# Patient Record
Sex: FEMALE | HISPANIC OR LATINO | Employment: UNEMPLOYED | URBAN - METROPOLITAN AREA
[De-identification: names, ages, dates, MRNs, and addresses within clinical notes are randomized per-mention and may not be internally consistent; named-entity substitution may affect disease eponyms.]

---

## 2023-11-27 ENCOUNTER — VIRTUAL VISIT (OUTPATIENT)
Dept: OBGYN | Facility: CLINIC | Age: 33
End: 2023-11-27
Payer: MEDICAID

## 2023-11-27 ENCOUNTER — TELEPHONE (OUTPATIENT)
Dept: MIDWIFE SERVICES | Facility: CLINIC | Age: 33
End: 2023-11-27

## 2023-11-27 VITALS — HEIGHT: 67 IN

## 2023-11-27 DIAGNOSIS — O21.9 NAUSEA/VOMITING IN PREGNANCY: Primary | ICD-10-CM

## 2023-11-27 DIAGNOSIS — Z34.82 ENCOUNTER FOR SUPERVISION OF OTHER NORMAL PREGNANCY IN SECOND TRIMESTER: Primary | ICD-10-CM

## 2023-11-27 PROCEDURE — 99207 PR NO CHARGE NURSE ONLY: CPT | Mod: 93

## 2023-11-27 RX ORDER — VITAMIN A ACETATE, .BETA.-CAROTENE, ASCORBIC ACID, CHOLECALCIFEROL, .ALPHA.-TOCOPHEROL ACETATE, DL-, THIAMINE MONONITRATE, RIBOFLAVIN, NIACINAMIDE, PYRIDOXINE HYDROCHLORIDE, FOLIC ACID, CYANOCOBALAMIN, CALCIUM CARBONATE, FERROUS FUMARATE, ZINC OXIDE, AND CUPRIC OXIDE 2000; 2000; 120; 400; 22; 1.84; 3; 20; 10; 1; 12; 200; 27; 25; 2 [IU]/1; [IU]/1; MG/1; [IU]/1; MG/1; MG/1; MG/1; MG/1; MG/1; MG/1; UG/1; MG/1; MG/1; MG/1; MG/1
1 TABLET ORAL DAILY
COMMUNITY

## 2023-11-27 RX ORDER — ONDANSETRON 4 MG/1
4 TABLET, FILM COATED ORAL EVERY 8 HOURS PRN
Qty: 30 TABLET | Refills: 1 | Status: SHIPPED | OUTPATIENT
Start: 2023-11-27 | End: 2024-02-28

## 2023-11-27 NOTE — PROGRESS NOTES
NPN nurse visit done over the phone. Pt will be given NPN folder and book at her upcoming appt.   Discussed optional screening available to assess chromosomal anomalies. Questions answered. Pt advised to call the clinic if she has any questions or concerns related to her pregnancy. Prenatal labs will be obtained at her upcoming appt. New prenatal visit scheduled on 23 with Natalee Adams CNM.     15w1d    Last pap: 16 (?)        Patient supplied answers from flow sheet for:  Prenatal OB Questionnaire.  Past Medical History  Have you ever recieved care for your mental health? : No  Have you ever been in a major accident or suffered serious trauma?: No  Within the last year, has anyone hit, slapped, kicked or otherwise hurt you?: No  In the last year, has anyone forced you to have sex when you didn't want to?: No    Past Medical History 2   Have you ever received a blood transfusion?: No  Would you accept a blood transfusion if was medically recommended?: Unknown  Does anyone in your home smoke?: No   Is your blood type Rh negative?: No  Have you ever ?: (!) Yes  Have you been hospitalized for a nonsurgical reason excluding normal delivery?: No  Have you ever had an abnormal pap smear?: No    Past Medical History (Continued)  Do you have a history of abnormalities of the uterus?: Unknown  Did your mother take MEKHI or any other hormones when she was pregnant with you?: No  Do you have any other problems we have not asked about which you feel may be important to this pregnancy?: No    ZAHEER Diamond

## 2023-11-27 NOTE — TELEPHONE ENCOUNTER
15w1d having a lot of N/V and requesting Zofran. Pharmacy marked. Will you prescribe please?    Initial OB: 23 ([facility])    ZAHEER Diamond

## 2023-12-03 LAB
ABO/RH(D): NORMAL
ANTIBODY SCREEN: NEGATIVE
SPECIMEN EXPIRATION DATE: NORMAL

## 2023-12-04 ENCOUNTER — ANCILLARY PROCEDURE (OUTPATIENT)
Dept: ULTRASOUND IMAGING | Facility: CLINIC | Age: 33
End: 2023-12-04
Payer: MEDICAID

## 2023-12-04 ENCOUNTER — LAB (OUTPATIENT)
Dept: LAB | Facility: CLINIC | Age: 33
End: 2023-12-04
Payer: MEDICAID

## 2023-12-04 DIAGNOSIS — Z34.82 ENCOUNTER FOR SUPERVISION OF OTHER NORMAL PREGNANCY IN SECOND TRIMESTER: ICD-10-CM

## 2023-12-04 LAB
ERYTHROCYTE [DISTWIDTH] IN BLOOD BY AUTOMATED COUNT: 21 % (ref 10–15)
HCT VFR BLD AUTO: 30.6 % (ref 35–47)
HGB BLD-MCNC: 9.6 G/DL (ref 11.7–15.7)
MCH RBC QN AUTO: 20.7 PG (ref 26.5–33)
MCHC RBC AUTO-ENTMCNC: 31.4 G/DL (ref 31.5–36.5)
MCV RBC AUTO: 66 FL (ref 78–100)
PLATELET # BLD AUTO: 315 10E3/UL (ref 150–450)
RBC # BLD AUTO: 4.63 10E6/UL (ref 3.8–5.2)
WBC # BLD AUTO: 6.7 10E3/UL (ref 4–11)

## 2023-12-04 PROCEDURE — 86900 BLOOD TYPING SEROLOGIC ABO: CPT

## 2023-12-04 PROCEDURE — 86762 RUBELLA ANTIBODY: CPT

## 2023-12-04 PROCEDURE — 36415 COLL VENOUS BLD VENIPUNCTURE: CPT

## 2023-12-04 PROCEDURE — 87389 HIV-1 AG W/HIV-1&-2 AB AG IA: CPT

## 2023-12-04 PROCEDURE — 87086 URINE CULTURE/COLONY COUNT: CPT

## 2023-12-04 PROCEDURE — 85027 COMPLETE CBC AUTOMATED: CPT

## 2023-12-04 PROCEDURE — 86780 TREPONEMA PALLIDUM: CPT

## 2023-12-04 PROCEDURE — 87340 HEPATITIS B SURFACE AG IA: CPT

## 2023-12-04 PROCEDURE — 86803 HEPATITIS C AB TEST: CPT

## 2023-12-04 PROCEDURE — 76805 OB US >/= 14 WKS SNGL FETUS: CPT | Performed by: FAMILY MEDICINE

## 2023-12-04 PROCEDURE — 86901 BLOOD TYPING SEROLOGIC RH(D): CPT

## 2023-12-04 PROCEDURE — 86850 RBC ANTIBODY SCREEN: CPT

## 2023-12-05 LAB
BACTERIA UR CULT: NORMAL
HBV SURFACE AG SERPL QL IA: NONREACTIVE
HCV AB SERPL QL IA: NONREACTIVE
HIV 1+2 AB+HIV1 P24 AG SERPL QL IA: NONREACTIVE
RUBV IGG SERPL QL IA: 2.23 INDEX
RUBV IGG SERPL QL IA: POSITIVE
T PALLIDUM AB SER QL: NONREACTIVE

## 2023-12-07 ENCOUNTER — PRENATAL OFFICE VISIT (OUTPATIENT)
Dept: MIDWIFE SERVICES | Facility: CLINIC | Age: 33
End: 2023-12-07
Payer: MEDICAID

## 2023-12-07 VITALS
WEIGHT: 210 LBS | BODY MASS INDEX: 32.96 KG/M2 | DIASTOLIC BLOOD PRESSURE: 82 MMHG | HEIGHT: 67 IN | SYSTOLIC BLOOD PRESSURE: 130 MMHG

## 2023-12-07 DIAGNOSIS — D64.9 ANEMIA, UNSPECIFIED TYPE: ICD-10-CM

## 2023-12-07 DIAGNOSIS — O21.9 NAUSEA AND VOMITING IN PREGNANCY PRIOR TO 22 WEEKS GESTATION: ICD-10-CM

## 2023-12-07 DIAGNOSIS — Z34.82 ENCOUNTER FOR SUPERVISION OF OTHER NORMAL PREGNANCY, SECOND TRIMESTER: Primary | ICD-10-CM

## 2023-12-07 LAB
HBA1C MFR BLD: 5.1 % (ref 0–5.6)
TSH SERPL DL<=0.005 MIU/L-ACNC: 0.5 UIU/ML (ref 0.3–4.2)

## 2023-12-07 PROCEDURE — 36415 COLL VENOUS BLD VENIPUNCTURE: CPT | Performed by: ADVANCED PRACTICE MIDWIFE

## 2023-12-07 PROCEDURE — 87491 CHLMYD TRACH DNA AMP PROBE: CPT | Performed by: ADVANCED PRACTICE MIDWIFE

## 2023-12-07 PROCEDURE — 99207 PR FIRST OB VISIT: CPT | Performed by: ADVANCED PRACTICE MIDWIFE

## 2023-12-07 PROCEDURE — 83036 HEMOGLOBIN GLYCOSYLATED A1C: CPT | Performed by: ADVANCED PRACTICE MIDWIFE

## 2023-12-07 PROCEDURE — 84443 ASSAY THYROID STIM HORMONE: CPT | Performed by: ADVANCED PRACTICE MIDWIFE

## 2023-12-07 PROCEDURE — 87591 N.GONORRHOEAE DNA AMP PROB: CPT | Performed by: ADVANCED PRACTICE MIDWIFE

## 2023-12-07 RX ORDER — FERROUS SULFATE 325(65) MG
325 TABLET, DELAYED RELEASE (ENTERIC COATED) ORAL DAILY
Qty: 90 TABLET | Refills: 4 | Status: SHIPPED | OUTPATIENT
Start: 2023-12-07

## 2023-12-07 RX ORDER — SCOLOPAMINE TRANSDERMAL SYSTEM 1 MG/1
1 PATCH, EXTENDED RELEASE TRANSDERMAL
Qty: 10 PATCH | Refills: 3 | Status: SHIPPED | OUTPATIENT
Start: 2023-12-07 | End: 2024-02-28

## 2023-12-07 RX ORDER — SENNA AND DOCUSATE SODIUM 50; 8.6 MG/1; MG/1
1 TABLET, FILM COATED ORAL AT BEDTIME
Qty: 90 TABLET | Refills: 4 | Status: ON HOLD | OUTPATIENT
Start: 2023-12-07 | End: 2024-05-19

## 2023-12-07 NOTE — PATIENT INSTRUCTIONS
"Weeks 14 to 18 of Your Pregnancy: Care Instructions  Around this time, you may start to look pregnant. Your baby is now able to pass urine. And the first stool (meconium) is starting to collect in your baby's intestines. Hair is starting to grow on your baby's head.    You may notice some skin changes, such as itchy spots on your palms or acne on your face.   At your next doctor visit, you may have an ultrasound. So you might think about whether you want to know the sex of your baby. Also ask your doctor about flu and COVID-19 shots.      How to reduce stress   Ask for help when you need it.  Try to avoid things that cause you stress.  Seek out things that relieve stress, such as breathing exercises or yoga.     How to get exercise   If you don't usually exercise, start slowly. Short walks may be a good choice.  Try to be active 30 minutes a day, at least 5 days a week.  Avoid activities where you're more likely to fall.  Use light weights to reduce stress on your joints.     How to stay at a healthy weight for you   Talk to your doctor or midwife about how much weight you should gain.  It's generally best to gain:  About 28 to 40 pounds if you're underweight.  About 25 to 35 pounds if you're at a healthy weight.  About 15 to 25 pounds if you're overweight.  About 11 to 20 pounds if you're very overweight (obese).  Follow-up care is a key part of your treatment and safety. Be sure to make and go to all appointments, and call your doctor if you are having problems. It's also a good idea to know your test results and keep a list of the medicines you take.  Where can you learn more?  Go to https://www.[employer].net/patiented  Enter I453 in the search box to learn more about \"Weeks 14 to 18 of Your Pregnancy: Care Instructions.\"  Current as of: July 11, 2023               Content Version: 13.8    4481-6791 [employer].   Care instructions adapted under license by your healthcare professional. If you have " questions about a medical condition or this instruction, always ask your healthcare professional. [employer], [facility] disclaims any warranty or liability for your use of this information.      Second-Trimester Fetal Ultrasound: About This Test  What is it?     Fetal ultrasound is a test that uses sound waves to make pictures of your baby (fetus) and placenta inside the uterus. The test is the safest way to find out the age, size, and position of your baby. You also may be able to find out the sex of your baby. (But the test isn't done just to find out a baby's sex.)  No known risks to the mother or the baby are linked to fetal ultrasound. But you may feel anxious if the test reveals a problem with your pregnancy or baby.  Why is this test done?  In the second trimester, a fetal ultrasound is done to:  Estimate the number of weeks and days a fetus has developed since the beginning of the pregnancy. This is called the gestational age.  Look at the size and position of the fetus, the placenta, and the fluid that surrounds the fetus.  Find major birth defects, such as heart problems or problems with the brain and spinal cord (neural tube defects). But the test may not be able to find many minor defects and some major birth defects.  How do you prepare for the test?  In general, there's nothing you have to do before this test, unless your doctor tells you to.  How is the test done?  You may be able to leave your clothes on, or you will be given a gown to wear.  You will lie on your back on a padded examination table.  A gel will be spread on your belly. It will be removed after the test.  A small, handheld device called a transducer will be pressed against the gel on your skin and moved across your belly several times.  You may watch the monitor to see the picture of your baby during the test.  What happens after the test?  You will probably be able to go home right away.  You most likely will be able to go back to  "your usual activities right away.  Follow-up care is a key part of your treatment and safety. Be sure to make and go to all appointments, and call your doctor if you are having problems. It's also a good idea to keep a list of the medicines you take. Ask your doctor when you can expect to have your test results.  Where can you learn more?  Go to https://www.[employer]/patiented  Enter Y671 in the search box to learn more about \"Second-Trimester Fetal Ultrasound: About This Test.\"  Current as of: July 11, 2023               Content Version: 13.8    2384-2515 [employer].   Care instructions adapted under license by your healthcare professional. If you have questions about a medical condition or this instruction, always ask your healthcare professional. [employer] disclaims any warranty or liability for your use of this information.      During Pregnancy: Exercises  Introduction  Here are some examples of exercises to do during your pregnancy. Start each exercise slowly. Ease off the exercise if you start to have pain.  Talk to your doctor about when you can start these exercises and which ones will work best for you.  How to do the exercises  Neck rotation    Sit in a firm chair, or stand tall. If you're standing, keep your feet about hip-width apart.  Keeping your chin level, turn your head to the right and hold for 15 to 30 seconds.  Turn your head to the left and hold for 15 to 30 seconds.  Repeat 2 to 4 times.  Next stretch to the front    Sit in a firm chair, or stand tall. Look straight ahead. If you're standing, keep your feet about hip-width apart.  Slowly bend your head forward without moving your shoulders.  Hold for 15 to 30 seconds, then return to your starting position.  Repeat 2 to 4 times.  Back press    Place your feet 10 to 12 inches from the wall.  Rest your back flat against the wall and slide down the wall until your knees are slightly bent.  Press your lower back " against the wall by pulling in your stomach muscles.  Hold for 6 seconds, and then relax your stomach muscles and slide back up the wall.  Repeat 8 to 12 times.  Trunk twist (seated)    Sit on the floor with your legs crossed. If that's not comfortable, you can sit on a folded blanket so your buttocks are a few inches off the floor. Or you can sit on a chair with your knees comfortably spread apart and your feet flat on the floor.  Reach your left hand toward your right knee. You can place your right hand at your side for support.  Slowly twist your body (trunk) to your right.  Relax and return to your starting position.  Repeat 2 to 4 times.  Switch your hands and twist to your left.  Repeat 2 to 4 times.  Pelvic rocking    Kneeling on hands and knees, place your hands directly under your shoulders and your knees under your hips.  Breathe in deeply. Tuck your head downward and round your back up, making a curve with your back in the shape of the letter C. Hold this position for a count of 6.  Breathe out slowly and bring your head back up. Relax, keeping your back straight (don't allow it to curve toward the floor). Hold this for a count of 6.  Do this exercise 8 times or to your comfort level.  Pelvic tilt    This exercise strengthens your lower back and pelvis. It is for use during the first 4 months of pregnancy. After this point, lying on your back is not recommended, because it can cause blood flow problems for you and your baby.  Lie on your back.  Keep your knees relaxed.  Tighten your belly and buttocks muscles.  At the same time, gently shift your pelvis upward. This should flatten the curve in your back.  Hold for 6 seconds and then relax.  Gradually increase the number of tilts you do each day, to your comfort level.  Backward stretch    Kneel on hands and knees with your knees 8 to 10 inches apart, hands directly under your shoulders, and arms and back straight.  Keeping your arms straight, slowly lower  your buttocks toward your heels and tuck your head toward your knees. Hold for 15 to 30 seconds.  Slowly return to the kneeling position.  Repeat 2 to 4 times.  Forward bend    Sit comfortably in a chair, with your arms relaxed.  Slowly bend forward, allowing your arms to hang down in front of you. Lean only as far as you can without feeling discomfort or pressure on your belly.  Hold for 15 to 30 seconds and then slowly sit up straight.  Repeat 2 to 4 times or to your comfort level.  Leg lift crawl    Kneeling on hands and knees, place your hands directly under your shoulders and straighten your arms.  Tighten your belly muscles by pulling in your belly button toward your spine. Be sure you continue to breathe normally and do not hold your breath.  Lift your left knee and bring it toward your elbow.  Slowly extend your leg behind you without completely straightening it. Be careful not to let your hip drop down. Avoid arching your back.  Hold your leg behind you for about 6 seconds.  Return to your starting position.  Do the same exercise with your other leg.  Repeat 8 to 12 times for each leg.  Tailor sitting    Sit on the floor.  Bring your feet close to your body while crossing your ankles.  Hold this position for as long as you are comfortable.  Tailor stretching    Sit on the floor with your back straight, legs about 12 inches apart, and feet relaxed outward.  Stretch your hands forward toward your left foot, then sit up.  Stretch your hands straight forward, then sit up.  Stretch your hands forward toward your right foot, then sit up.  Hold each stretch for 15 to 30 seconds.  Repeat 2 to 4 times.  Follow-up care is a key part of your treatment and safety. Be sure to make and go to all appointments, and call your doctor if you are having problems. It's also a good idea to know your test results and keep a list of the medicines you take.  Current as of: July 11, 2023               Content Version: 13.8     8826-6704 [employer].   Care instructions adapted under license by your healthcare professional. If you have questions about a medical condition or this instruction, always ask your healthcare professional. [employer] disclaims any warranty or liability for your use of this information.      Round Ligament Pain: Care Instructions  Your Care Instructions     Round ligament pain is a common pain during pregnancy. You may feel a sharp brief pain on one or both sides of your belly. It may go down into your groin. It's usually felt for the first time during the second trimester.  This pain is a normal part of pregnancy. It will go away as your pregnancy continues or after your baby is born.  Your uterus is supported by two ligaments that go from the top and sides of the uterus to the bones of the pelvis. These are the round ligaments. As your uterus grows, these ligaments stretch and tighten with your movements. This may be the cause of the pain. You may find that certain activities seem to cause pain. If you can, avoid those activities.  Your doctor can usually diagnose round ligament pain from your symptoms and an exam. If you have bleeding or other symptoms, your doctor may also do an imaging test, such as an ultrasound. Your doctor may suggest that you take an over-the-counter pain medicine, such as acetaminophen.  Follow-up care is a key part of your treatment and safety. Be sure to make and go to all appointments, and call your doctor if you are having problems. It's also a good idea to know your test results and keep a list of the medicines you take.  How can you care for yourself at home?  If certain movements seem to trigger belly pain, see if you can avoid them while you are pregnant.  Stay active. If your doctor says it's okay, try moderate exercise. Water exercise may be a good choice if you have belly pain. Examples are swimming and water aerobics.  Ask your doctor about taking  "acetaminophen for pain. Be safe with medicines. Read and follow all instructions on the label.  When should you call for help?   Call your doctor now or seek immediate medical care if:    You think you might be in labor.     You have new or worse pain.   Watch closely for changes in your health, and be sure to contact your doctor if you have any problems.  Where can you learn more?  Go to https://www.[employer].net/patiented  Enter R110 in the search box to learn more about \"Round Ligament Pain: Care Instructions.\"  Current as of: July 11, 2023               Content Version: 13.8    4997-6697 [employer].   Care instructions adapted under license by your healthcare professional. If you have questions about a medical condition or this instruction, always ask your healthcare professional. [employer] disclaims any warranty or liability for your use of this information.      "

## 2023-12-07 NOTE — NURSING NOTE
"Chief Complaint   Patient presents with    Prenatal Care     16w 4d       Initial /82   Ht 1.702 m (5' 7\")   Wt 95.3 kg (210 lb)   LMP 2023 (Exact Date)   BMI 32.89 kg/m   Estimated body mass index is 32.89 kg/m  as calculated from the following:    Height as of this encounter: 1.702 m (5' 7\").    Weight as of this encounter: 95.3 kg (210 lb).  BP completed using cuff size: regular    Questioned patient about current smoking habits.  Pt. has never smoked.          Michelle Wilde LPN on 2023 at 9:28 AM           "

## 2023-12-07 NOTE — PROGRESS NOTES
Roya Mckeon is a 33 year old   woman,  who is not a previous CNM patient. She presents for a new OB Visit. This was a planned pregnancy.     FOB is Jayson who is in good health.  FOB IS actively involved in relationship and this pregnancy.      She has not had bleeding since her LMP.    She denies abdominal pain since her LMP.  She has had nausea and vomiting. Many food aversions. Vomiting once daily.   Any personal or family history of blood clots? No  History of sickle cell anemia or trait? No         Patient's last menstrual period was 2023 (exact date)..  Estimated Date of Delivery: May 19, 2024 Ultrasound consistent with LMP.    MENSTRUAL HISTORY    frequency: very irregular  Last PAP:  none on file  History of abnormal Pap?  No    Health maintenance updated:  yes        Current medications are:    Current Outpatient Medications:     ondansetron (ZOFRAN) 4 MG tablet, Take 1 tablet (4 mg) by mouth every 8 hours as needed for nausea, Disp: 30 tablet, Rfl: 1    Prenatal Vit-Fe Fumarate-FA (PNV PRENATAL PLUS MULTIVITAMIN) 27-1 MG TABS per tablet, Take 1 tablet by mouth daily, Disp: , Rfl:      What medications are you currently taking? PNV, Zofran     INFECTION HISTORY  HIV: No  Hepatitis B: No  Hepatitis C: No  Tuberculosis: No    Genital Herpes self: no  Herpes partner:  no  Chlamydia:  no  Gonorrhea:  no  HPV: No  BV:  No  Syphilis:  No  Chicken Pox:  Yes - as a child       OB HISTORY  OB History    Para Term  AB Living   2 1 1 0 0 1   SAB IAB Ectopic Multiple Live Births   0 0 0 0 1      # Outcome Date GA Lbr Kev/2nd Weight Sex Delivery Anes PTL Lv   2 Current            1 Term 17    F Vag-Spont   ELI       History of GDM: No,  PTL : No,  History of HTN in pregnancy: No,  Thrombocytopenia: No,  Shoulder dystocia: No,  Vacuum Extraction: No  PPH: No   3rd of 4th degree laceration: No.   Other complications: No     Low Dose Aspirin for Preeclampsia  Prevention:  Consider for those with 1 high risk or 2  moderate risk factors     High risk: previous pregnancy with preeclampsia, multifetal gestation, chronic htn, diabetes, chronic kidney disease, autoimmune disorder     Medium risk: nulliparity, BMI >30, family hx preeclampsia, age >/= 35,  race, low SES, personal risk factors (hx low birth weight, hx stillbirth, >10yrs between pregnancies).      Patient not qualify for low dose aspirin therapy        PERSONAL HISTORY  Exercise Habits:  none at this time due to nausea  Employment: Unemployed.    Travel plans:  are none planned.   Diet: significant nausea and weight loss. Will start on patch and RTC in 2 weeks.   Prenatal vitamins? Yes: encouraged to start taking at night to help decrease nausea  Fish Oil? No  Abuse concerns? No  Any history if abuse, varbal, physical, sexual? No   Hgb A1c screen:  BMI > 30: Yes, 1st degree family DM: No, History of GDM: No, PCOS: No, High risk ethnicity: Yes    Social History     Socioeconomic History    Marital status:      Spouse name: Jayson    Number of children: 1    Years of education: Not on file    Highest education level: Not on file   Occupational History    Occupation: Homemaker   Tobacco Use    Smoking status: Never    Smokeless tobacco: Never   Vaping Use    Vaping Use: Never used   Substance and Sexual Activity    Alcohol use: Never    Drug use: Never    Sexual activity: Yes     Partners: Male   Other Topics Concern    Not on file   Social History Narrative    Not on file     Social Determinants of Health     Financial Resource Strain: Not on file   Food Insecurity: Not on file   Transportation Needs: Not on file   Physical Activity: Not on file   Stress: Not on file   Social Connections: Not on file   Interpersonal Safety: Not on file   Housing Stability: Not on file         She  reports that she has never smoked. She has never used smokeless tobacco.    STD testing offered?  Accepted  Last  "PHQ-9 score on record =        No data to display              Last GAD7 score on record =        No data to display              Alcohol Score = 0  Referral/Meds needed? no    PAST MEDICAL/SURGICAL HISTORY  Past Medical History:   Diagnosis Date    Varicella      History reviewed. No pertinent surgical history.    FAMILY HISTORY  History reviewed. No pertinent family history.      ROS:  CONSTITUTIONAL: NEGATIVE for fever, chills, change in weight  INTEGUMENTARU/SKIN: NEGATIVE for worrisome rashes, moles or lesions  EYES: NEGATIVE for vision changes or irritation  ENT: NEGATIVE for ear, mouth and throat problems  RESP: NEGATIVE for significant cough or SOB  BREAST: NEGATIVE for masses, tenderness or discharge  CV: NEGATIVE for chest pain, palpitations or peripheral edema  GI: NEGATIVE for nausea, abdominal pain, heartburn, or change in bowel habits  : NEGATIVE for unusual urinary or vaginal symptoms. Periods are regular.  MUSCULOSKELETAL: NEGATIVE for significant arthralgias or myalgia  NEURO: NEGATIVE for weakness, dizziness or paresthesias  PSYCHIATRIC: NEGATIVE for changes in mood or affect    PHYSICAL EXAM  Vitals: /82   Ht 1.702 m (5' 7\")   Wt 95.3 kg (210 lb)   LMP 08/13/2023 (Exact Date)   BMI 32.89 kg/m    BMI= Body mass index is 32.89 kg/m .     GENERAL:  33 year old pleasant pregnant female, alert, cooperative and well groomed.  NECK:  Thyroid without enlargement and nodules.  Lymph nodes not palpable.   LUNGS:  Clear to auscultation.  BREAST:  deferred  HEART:  RRR without murmur.  ABDOMEN: Soft without masses or tenderness.  No scars noted..  GENITALIA: BUS without tenderness or inflammation.  Perineum without lesions.    VAGINA:  Pink, normal rugae and discharge.  CERVIX:  deferred  UTERUS: non tender 16 weeks in size.  ADNEXA: Without masses or tenderness  RECTAL:  Normal appearance.  Digital exam deferred.  LOWER EXTREMITIES: No edema. No significant varicosities.    ASSESSMENT/PLAN:    " IUP at 16w4d    ICD-10-CM    1. Encounter for supervision of other normal pregnancy, second trimester  Z34.82       2. Anemia, unspecified type  D64.9            consult for US for AMA patients: NA  Genetic Testing reviewed and discussed, patient would like to talk with her  before making a decision.     COUNSELING  Instructed on use of triage nurse line and contacting the on call CNM after hours in an emergency.   Symptoms of N&V and fatigue usually start to resolve around 12-16 weeks. Started on scopolamine patch  Reviewed CNM philosophy, call schedule for labor and delivery, and [facility] for delivery  1st OB handout given outlining appointment spacing and CNM information  Reviewed exercise and nutrition  Recommend to gain 11-15 pounds with her pregnancy after regaining what has been lost  Discussed OTC medications. OB med list given  Encouraged patient to arrange  if needed  Encouraged patient to take PNV's/DHA  Travel precautions discussed, no air travel after 36 weeks and Zika Virus discussed  Will call patient with lab results when available      F/U to be addressed next visit: N&V started on scopolamine patch, weight loss in early pregnancy. Moved up return visit to 2 weeks for closer follow up on weight loss/N&V    Will return to the clinic in 2 weeks for her next routine prenatal check.  Will call to be seen sooner if problems arise.    Natalee Adams CNM

## 2023-12-08 LAB
C TRACH DNA SPEC QL NAA+PROBE: NEGATIVE
N GONORRHOEA DNA SPEC QL NAA+PROBE: NEGATIVE

## 2023-12-21 PROBLEM — O41.1230 CHORIOAMNIONITIS IN THIRD TRIMESTER: Status: RESOLVED | Noted: 2017-04-24 | Resolved: 2023-12-21

## 2023-12-31 ENCOUNTER — HEALTH MAINTENANCE LETTER (OUTPATIENT)
Age: 33
End: 2023-12-31

## 2024-01-03 ENCOUNTER — PRENATAL OFFICE VISIT (OUTPATIENT)
Dept: MIDWIFE SERVICES | Facility: CLINIC | Age: 34
End: 2024-01-03
Attending: ADVANCED PRACTICE MIDWIFE
Payer: MEDICAID

## 2024-01-03 ENCOUNTER — ANCILLARY PROCEDURE (OUTPATIENT)
Dept: ULTRASOUND IMAGING | Facility: CLINIC | Age: 34
End: 2024-01-03
Attending: ADVANCED PRACTICE MIDWIFE
Payer: MEDICAID

## 2024-01-03 VITALS — WEIGHT: 214 LBS | SYSTOLIC BLOOD PRESSURE: 116 MMHG | BODY MASS INDEX: 33.52 KG/M2 | DIASTOLIC BLOOD PRESSURE: 68 MMHG

## 2024-01-03 DIAGNOSIS — Z34.82 ENCOUNTER FOR SUPERVISION OF OTHER NORMAL PREGNANCY, SECOND TRIMESTER: Primary | ICD-10-CM

## 2024-01-03 DIAGNOSIS — Z34.82 ENCOUNTER FOR SUPERVISION OF OTHER NORMAL PREGNANCY, SECOND TRIMESTER: ICD-10-CM

## 2024-01-03 DIAGNOSIS — O21.9 NAUSEA AND VOMITING IN PREGNANCY PRIOR TO 22 WEEKS GESTATION: ICD-10-CM

## 2024-01-03 DIAGNOSIS — O99.012 ANEMIA IN PREGNANCY, SECOND TRIMESTER: ICD-10-CM

## 2024-01-03 LAB
ERYTHROCYTE [DISTWIDTH] IN BLOOD BY AUTOMATED COUNT: 20.1 % (ref 10–15)
FERRITIN SERPL-MCNC: 11 NG/ML (ref 6–175)
HCT VFR BLD AUTO: 30.3 % (ref 35–47)
HGB BLD-MCNC: 9.5 G/DL (ref 11.7–15.7)
IRON BINDING CAPACITY (ROCHE): 630 UG/DL (ref 240–430)
IRON SATN MFR SERPL: 4 % (ref 15–46)
IRON SERPL-MCNC: 26 UG/DL (ref 37–145)
MCH RBC QN AUTO: 21.3 PG (ref 26.5–33)
MCHC RBC AUTO-ENTMCNC: 31.4 G/DL (ref 31.5–36.5)
MCV RBC AUTO: 68 FL (ref 78–100)
PLATELET # BLD AUTO: 363 10E3/UL (ref 150–450)
RBC # BLD AUTO: 4.45 10E6/UL (ref 3.8–5.2)
WBC # BLD AUTO: 8.5 10E3/UL (ref 4–11)

## 2024-01-03 PROCEDURE — 99207 PR PRENATAL VISIT: CPT | Performed by: ADVANCED PRACTICE MIDWIFE

## 2024-01-03 PROCEDURE — 76805 OB US >/= 14 WKS SNGL FETUS: CPT | Performed by: OBSTETRICS & GYNECOLOGY

## 2024-01-03 PROCEDURE — 36415 COLL VENOUS BLD VENIPUNCTURE: CPT | Performed by: ADVANCED PRACTICE MIDWIFE

## 2024-01-03 PROCEDURE — 83550 IRON BINDING TEST: CPT | Performed by: ADVANCED PRACTICE MIDWIFE

## 2024-01-03 PROCEDURE — 85027 COMPLETE CBC AUTOMATED: CPT | Performed by: ADVANCED PRACTICE MIDWIFE

## 2024-01-03 PROCEDURE — 82728 ASSAY OF FERRITIN: CPT | Performed by: ADVANCED PRACTICE MIDWIFE

## 2024-01-03 PROCEDURE — 83540 ASSAY OF IRON: CPT | Performed by: ADVANCED PRACTICE MIDWIFE

## 2024-01-03 NOTE — NURSING NOTE
"Chief Complaint   Patient presents with    Prenatal Care     20w3d       Initial /68   Wt 97.1 kg (214 lb)   LMP 2023 (Exact Date)   BMI 33.52 kg/m   Estimated body mass index is 33.52 kg/m  as calculated from the following:    Height as of 23: 1.702 m (5' 7\").    Weight as of this encounter: 97.1 kg (214 lb).  BP completed using cuff size: large    Questioned patient about current smoking habits.  Pt. has never smoked.          BOY!  Butterfly feelings  Stopped using nausea patch as it made her dizzy  Still having food eversions, has been drinking Ensure  Theresa Liu CMA on 1/3/2024 at 2:17 PM     "

## 2024-01-03 NOTE — PROGRESS NOTES
S: Roya is here with her daughter and mother. She had two episodes of feeling dizzy and thought it was related to the patch so she stopped using it. She continues to be very fatigued. Has been doing iron supplementation and agreeable to labs today. Continues to have nausea with food aversions. Has started drinking Ensure and weight has increased from last visit. We did discuss the option of a phenergan suppository and she declines at this time. Found out she is having a boy and shares her sister had nausea throughout her entire pregnancies with two boys. Has started feeling fetal movement. Denies uterine cramping, vaginal bleeding or leaking of fluid  O: Vitals: /68   Wt 97.1 kg (214 lb)   LMP 08/13/2023 (Exact Date)   BMI 33.52 kg/m    BMI= Body mass index is 33.52 kg/m .  Exam:  Constitutional: healthy, alert and no distress  Respiratory: respirations even and unlabored  Gastrointestinal: Abdomen soft, non-tender. Fundus measures appropriate for gestational age. Fetal heart tones hear without difficulty and within normal limits  : Deferred  Psychiatric: mentation appears normal and affect normal/bright  A:    Diagnosis Comments   1. Encounter for supervision of other normal pregnancy, second trimester        2. Anemia in pregnancy, second trimester  CBC with platelets, Iron and iron binding capacity, Ferritin         P: Discuss 20 week fetal screen at next visit. Given information about midwives leaving [place]. Encouraged patient to call with any questions or concerns.    Natalee Adams CNM

## 2024-01-04 PROBLEM — O99.012 ANEMIA IN PREGNANCY, SECOND TRIMESTER: Status: ACTIVE | Noted: 2024-01-04

## 2024-01-04 RX ORDER — DIPHENHYDRAMINE HYDROCHLORIDE 50 MG/ML
50 INJECTION INTRAMUSCULAR; INTRAVENOUS
Status: CANCELLED
Start: 2024-01-04

## 2024-01-04 RX ORDER — ALBUTEROL SULFATE 0.83 MG/ML
2.5 SOLUTION RESPIRATORY (INHALATION)
Status: CANCELLED | OUTPATIENT
Start: 2024-01-04

## 2024-01-04 RX ORDER — HEPARIN SODIUM (PORCINE) LOCK FLUSH IV SOLN 100 UNIT/ML 100 UNIT/ML
5 SOLUTION INTRAVENOUS
Status: CANCELLED | OUTPATIENT
Start: 2024-01-04

## 2024-01-04 RX ORDER — EPINEPHRINE 1 MG/ML
0.3 INJECTION, SOLUTION, CONCENTRATE INTRAVENOUS EVERY 5 MIN PRN
Status: CANCELLED | OUTPATIENT
Start: 2024-01-04

## 2024-01-04 RX ORDER — HEPARIN SODIUM,PORCINE 10 UNIT/ML
5-20 VIAL (ML) INTRAVENOUS DAILY PRN
Status: CANCELLED | OUTPATIENT
Start: 2024-01-04

## 2024-01-04 RX ORDER — METHYLPREDNISOLONE SODIUM SUCCINATE 125 MG/2ML
125 INJECTION, POWDER, LYOPHILIZED, FOR SOLUTION INTRAMUSCULAR; INTRAVENOUS
Status: CANCELLED
Start: 2024-01-04

## 2024-01-04 RX ORDER — ALBUTEROL SULFATE 90 UG/1
1-2 AEROSOL, METERED RESPIRATORY (INHALATION)
Status: CANCELLED
Start: 2024-01-04

## 2024-01-15 ENCOUNTER — INFUSION THERAPY VISIT (OUTPATIENT)
Dept: INFUSION THERAPY | Facility: CLINIC | Age: 34
End: 2024-01-15
Attending: ADVANCED PRACTICE MIDWIFE
Payer: MEDICAID

## 2024-01-15 VITALS
TEMPERATURE: 98.3 F | OXYGEN SATURATION: 100 % | SYSTOLIC BLOOD PRESSURE: 105 MMHG | DIASTOLIC BLOOD PRESSURE: 71 MMHG | HEART RATE: 83 BPM | RESPIRATION RATE: 16 BRPM

## 2024-01-15 DIAGNOSIS — O99.012 ANEMIA IN PREGNANCY, SECOND TRIMESTER: Primary | ICD-10-CM

## 2024-01-15 PROCEDURE — 250N000011 HC RX IP 250 OP 636: Performed by: ADVANCED PRACTICE MIDWIFE

## 2024-01-15 PROCEDURE — 258N000003 HC RX IP 258 OP 636: Performed by: ADVANCED PRACTICE MIDWIFE

## 2024-01-15 PROCEDURE — 96366 THER/PROPH/DIAG IV INF ADDON: CPT

## 2024-01-15 PROCEDURE — 96365 THER/PROPH/DIAG IV INF INIT: CPT

## 2024-01-15 RX ORDER — HEPARIN SODIUM,PORCINE 10 UNIT/ML
5-20 VIAL (ML) INTRAVENOUS DAILY PRN
Status: CANCELLED | OUTPATIENT
Start: 2024-01-17

## 2024-01-15 RX ORDER — HEPARIN SODIUM (PORCINE) LOCK FLUSH IV SOLN 100 UNIT/ML 100 UNIT/ML
5 SOLUTION INTRAVENOUS
Status: CANCELLED | OUTPATIENT
Start: 2024-01-17

## 2024-01-15 RX ORDER — ALBUTEROL SULFATE 0.83 MG/ML
2.5 SOLUTION RESPIRATORY (INHALATION)
Status: CANCELLED | OUTPATIENT
Start: 2024-01-17

## 2024-01-15 RX ORDER — ALBUTEROL SULFATE 90 UG/1
1-2 AEROSOL, METERED RESPIRATORY (INHALATION)
Status: CANCELLED
Start: 2024-01-17

## 2024-01-15 RX ORDER — EPINEPHRINE 1 MG/ML
0.3 INJECTION, SOLUTION INTRAMUSCULAR; SUBCUTANEOUS EVERY 5 MIN PRN
Status: CANCELLED | OUTPATIENT
Start: 2024-01-17

## 2024-01-15 RX ORDER — DIPHENHYDRAMINE HYDROCHLORIDE 50 MG/ML
50 INJECTION INTRAMUSCULAR; INTRAVENOUS
Status: CANCELLED
Start: 2024-01-17

## 2024-01-15 RX ORDER — METHYLPREDNISOLONE SODIUM SUCCINATE 125 MG/2ML
125 INJECTION, POWDER, LYOPHILIZED, FOR SOLUTION INTRAMUSCULAR; INTRAVENOUS
Status: CANCELLED
Start: 2024-01-17

## 2024-01-15 RX ADMIN — SODIUM CHLORIDE 250 ML: 9 INJECTION, SOLUTION INTRAVENOUS at 13:58

## 2024-01-15 RX ADMIN — IRON SUCROSE 300 MG: 20 INJECTION, SOLUTION INTRAVENOUS at 13:58

## 2024-01-15 NOTE — PROGRESS NOTES
Infusion Nursing Note:  Roya Mckeon presents today for Venofer, 1 of 3.    Patient seen by provider today: No   present during visit today: Not Applicable.    Note: Reviewed printed venofer information and potential side effects.      Intravenous Access:  Peripheral IV placed.    Treatment Conditions:  Not Applicable.      Post Infusion Assessment:  Patient tolerated infusion without incident.  Blood return noted pre and post infusion.  Site patent and intact, free from redness, edema or discomfort.  No evidence of extravasations.  Access discontinued per protocol.       Discharge Plan:   AVS to patient via MYCHART.  Patient will return 1/18/24 for next appointment.   Patient discharged in stable condition accompanied by: self.  Departure Mode: Ambulatory.      Nicole Wood RN

## 2024-01-18 ENCOUNTER — INFUSION THERAPY VISIT (OUTPATIENT)
Dept: INFUSION THERAPY | Facility: CLINIC | Age: 34
End: 2024-01-18
Attending: ADVANCED PRACTICE MIDWIFE
Payer: MEDICAID

## 2024-01-18 VITALS
DIASTOLIC BLOOD PRESSURE: 78 MMHG | TEMPERATURE: 97.6 F | OXYGEN SATURATION: 100 % | SYSTOLIC BLOOD PRESSURE: 126 MMHG | RESPIRATION RATE: 16 BRPM | HEART RATE: 101 BPM

## 2024-01-18 DIAGNOSIS — O99.012 ANEMIA IN PREGNANCY, SECOND TRIMESTER: Primary | ICD-10-CM

## 2024-01-18 PROCEDURE — 250N000011 HC RX IP 250 OP 636: Performed by: ADVANCED PRACTICE MIDWIFE

## 2024-01-18 PROCEDURE — 258N000003 HC RX IP 258 OP 636: Performed by: ADVANCED PRACTICE MIDWIFE

## 2024-01-18 PROCEDURE — 96366 THER/PROPH/DIAG IV INF ADDON: CPT

## 2024-01-18 PROCEDURE — 96365 THER/PROPH/DIAG IV INF INIT: CPT

## 2024-01-18 RX ORDER — ALBUTEROL SULFATE 0.83 MG/ML
2.5 SOLUTION RESPIRATORY (INHALATION)
Status: CANCELLED | OUTPATIENT
Start: 2024-01-19

## 2024-01-18 RX ORDER — HEPARIN SODIUM (PORCINE) LOCK FLUSH IV SOLN 100 UNIT/ML 100 UNIT/ML
5 SOLUTION INTRAVENOUS
Status: CANCELLED | OUTPATIENT
Start: 2024-01-19

## 2024-01-18 RX ORDER — ALBUTEROL SULFATE 90 UG/1
1-2 AEROSOL, METERED RESPIRATORY (INHALATION)
Status: CANCELLED
Start: 2024-01-19

## 2024-01-18 RX ORDER — HEPARIN SODIUM,PORCINE 10 UNIT/ML
5-20 VIAL (ML) INTRAVENOUS DAILY PRN
Status: CANCELLED | OUTPATIENT
Start: 2024-01-19

## 2024-01-18 RX ORDER — DIPHENHYDRAMINE HYDROCHLORIDE 50 MG/ML
50 INJECTION INTRAMUSCULAR; INTRAVENOUS
Status: CANCELLED
Start: 2024-01-19

## 2024-01-18 RX ORDER — METHYLPREDNISOLONE SODIUM SUCCINATE 125 MG/2ML
125 INJECTION, POWDER, LYOPHILIZED, FOR SOLUTION INTRAMUSCULAR; INTRAVENOUS
Status: CANCELLED
Start: 2024-01-19

## 2024-01-18 RX ORDER — EPINEPHRINE 1 MG/ML
0.3 INJECTION, SOLUTION INTRAMUSCULAR; SUBCUTANEOUS EVERY 5 MIN PRN
Status: CANCELLED | OUTPATIENT
Start: 2024-01-19

## 2024-01-18 RX ADMIN — IRON SUCROSE 300 MG: 20 INJECTION, SOLUTION INTRAVENOUS at 13:48

## 2024-01-18 RX ADMIN — SODIUM CHLORIDE 250 ML: 9 INJECTION, SOLUTION INTRAVENOUS at 13:48

## 2024-01-18 NOTE — PROGRESS NOTES
Infusion Nursing Note:  Roya Mckeon presents today for Venofer #2/3.    Patient seen by provider today: No   present during visit today: Not Applicable.    Note: na.    Intravenous Access:  Peripheral IV placed.    Treatment Conditions:  Not Applicable.    Post Infusion Assessment:  Patient tolerated infusion without incident.  Blood return noted pre and post infusion.  Site patent and intact, free from redness, edema or discomfort.  No evidence of extravasations.  Access discontinued per protocol.       Discharge Plan:   AVS to patient via MYCHART.  Patient will return 1/22 for next appointment.   Patient discharged in stable condition accompanied by: self.  Departure Mode: Ambulatory.      Tayler Blanchard RN

## 2024-01-22 ENCOUNTER — INFUSION THERAPY VISIT (OUTPATIENT)
Dept: INFUSION THERAPY | Facility: CLINIC | Age: 34
End: 2024-01-22
Attending: ADVANCED PRACTICE MIDWIFE
Payer: MEDICAID

## 2024-01-22 VITALS
TEMPERATURE: 98.6 F | DIASTOLIC BLOOD PRESSURE: 69 MMHG | HEART RATE: 100 BPM | SYSTOLIC BLOOD PRESSURE: 101 MMHG | RESPIRATION RATE: 18 BRPM | OXYGEN SATURATION: 98 %

## 2024-01-22 DIAGNOSIS — O99.012 ANEMIA IN PREGNANCY, SECOND TRIMESTER: Primary | ICD-10-CM

## 2024-01-22 PROCEDURE — 96365 THER/PROPH/DIAG IV INF INIT: CPT

## 2024-01-22 PROCEDURE — 250N000011 HC RX IP 250 OP 636: Performed by: ADVANCED PRACTICE MIDWIFE

## 2024-01-22 PROCEDURE — 258N000003 HC RX IP 258 OP 636: Performed by: ADVANCED PRACTICE MIDWIFE

## 2024-01-22 PROCEDURE — 96366 THER/PROPH/DIAG IV INF ADDON: CPT

## 2024-01-22 RX ORDER — HEPARIN SODIUM (PORCINE) LOCK FLUSH IV SOLN 100 UNIT/ML 100 UNIT/ML
5 SOLUTION INTRAVENOUS
OUTPATIENT
Start: 2024-01-22

## 2024-01-22 RX ORDER — ALBUTEROL SULFATE 90 UG/1
1-2 AEROSOL, METERED RESPIRATORY (INHALATION)
Start: 2024-01-22

## 2024-01-22 RX ORDER — DIPHENHYDRAMINE HYDROCHLORIDE 50 MG/ML
50 INJECTION INTRAMUSCULAR; INTRAVENOUS
Start: 2024-01-22

## 2024-01-22 RX ORDER — EPINEPHRINE 1 MG/ML
0.3 INJECTION, SOLUTION INTRAMUSCULAR; SUBCUTANEOUS EVERY 5 MIN PRN
OUTPATIENT
Start: 2024-01-22

## 2024-01-22 RX ORDER — HEPARIN SODIUM,PORCINE 10 UNIT/ML
5-20 VIAL (ML) INTRAVENOUS DAILY PRN
OUTPATIENT
Start: 2024-01-22

## 2024-01-22 RX ORDER — ALBUTEROL SULFATE 0.83 MG/ML
2.5 SOLUTION RESPIRATORY (INHALATION)
OUTPATIENT
Start: 2024-01-22

## 2024-01-22 RX ORDER — METHYLPREDNISOLONE SODIUM SUCCINATE 125 MG/2ML
125 INJECTION, POWDER, LYOPHILIZED, FOR SOLUTION INTRAMUSCULAR; INTRAVENOUS
Start: 2024-01-22

## 2024-01-22 RX ADMIN — IRON SUCROSE 300 MG: 20 INJECTION, SOLUTION INTRAVENOUS at 13:49

## 2024-01-22 RX ADMIN — SODIUM CHLORIDE 250 ML: 9 INJECTION, SOLUTION INTRAVENOUS at 13:49

## 2024-01-22 NOTE — PROGRESS NOTES
Infusion Nursing Note:  Roya TELLEZ Mike presents today for venofer.    Patient seen by provider today: No   present during visit today: Not Applicable.    Note: denies any side effects fro prior infusions.      Intravenous Access:  Peripheral IV placed.    Treatment Conditions:   Latest Reference Range & Units 01/03/24 14:46   Ferritin 6 - 175 ng/mL 11   Iron 37 - 145 ug/dL 26 (L)   Iron Binding Capacity 240 - 430 ug/dL 630 (H)   Iron Sat Index 15 - 46 % 4 (L)   (L): Data is abnormally low  (H): Data is abnormally high.      Post Infusion Assessment:  Patient tolerated infusion without incident.  Blood return noted pre and post infusion.  Site patent and intact, free from redness, edema or discomfort.  No evidence of extravasations.  Access discontinued per protocol.       Discharge Plan:   Discharge instructions reviewed with: Patient.  Patient and/or family verbalized understanding of discharge instructions and all questions answered.  AVS to patient via [employer].  Patient has completed all 3 venofer infusions ordered  Patient discharged in stable condition accompanied by: self.  Departure Mode: Ambulatory.      Rocio Harris RN

## 2024-02-01 ENCOUNTER — PRENATAL OFFICE VISIT (OUTPATIENT)
Dept: MIDWIFE SERVICES | Facility: CLINIC | Age: 34
End: 2024-02-01
Payer: MEDICAID

## 2024-02-01 VITALS — WEIGHT: 219 LBS | SYSTOLIC BLOOD PRESSURE: 112 MMHG | BODY MASS INDEX: 34.3 KG/M2 | DIASTOLIC BLOOD PRESSURE: 62 MMHG

## 2024-02-01 DIAGNOSIS — Z34.82 ENCOUNTER FOR SUPERVISION OF OTHER NORMAL PREGNANCY, SECOND TRIMESTER: Primary | ICD-10-CM

## 2024-02-01 DIAGNOSIS — O21.9 NAUSEA/VOMITING IN PREGNANCY: ICD-10-CM

## 2024-02-01 PROCEDURE — 99207 PR PRENATAL VISIT: CPT | Performed by: ADVANCED PRACTICE MIDWIFE

## 2024-02-01 NOTE — NURSING NOTE
"Chief Complaint   Patient presents with    Prenatal Care   24w4d    initial /62   Wt 99.3 kg (219 lb)   LMP 08/13/2023 (Exact Date)   BMI 34.30 kg/m   Estimated body mass index is 34.3 kg/m  as calculated from the following:    Height as of 12/7/23: 1.702 m (5' 7\").    Weight as of this encounter: 99.3 kg (219 lb).  BP completed using cuff size regular.  Edie Floyd CMA    "

## 2024-02-01 NOTE — PROGRESS NOTES
S: Roya is here alone for a SAE. She is feeling better with just nausea in the morning. Has completed iron infusions and has more energy. Baby active. Denies uterine cramping, vaginal bleeding or leaking of fluid  O: Vitals: /62   Wt 99.3 kg (219 lb)   LMP 08/13/2023 (Exact Date)   BMI 34.30 kg/m    BMI= Body mass index is 34.3 kg/m .  Exam:  Constitutional: healthy, alert and no distress  Respiratory: respirations even and unlabored  Gastrointestinal: Abdomen soft, non-tender. Fundus measures appropriate for gestational age. Fetal heart tones hear without difficulty and within normal limits  : Deferred  Psychiatric: mentation appears normal and affect normal/bright  A:    Diagnosis Comments   1. Encounter for supervision of other normal pregnancy, second trimester        2. Nausea/vomiting in pregnancy          P: Discussed GCT/CBC/repeat RPR for next visit, handout provided, reminded of longer appointment.  Tdap between 28-36 weeks gestation; reviewed CDC recommendations and partner/family vaccination recommended as well.  Need for Rhogam? No  Encouraged patient to call with any questions or concerns.  Return to clinic 4 weeks and will be switching to the MD's here in [place].     Natalee Adams CNM

## 2024-02-01 NOTE — PATIENT INSTRUCTIONS
Learning About Screening for Gestational Diabetes  What is gestational diabetes screening?     Screening for gestational diabetes is a way to look for high blood sugar during pregnancy. You drink some very sweet liquid. Then you have a blood test to see how your body uses sugar (glucose).  How is gestational diabetes screening done?  Screening for gestational diabetes may be done in a couple of ways.  Two-part screening.  Part one (glucose challenge test): A blood sample is taken after you drink a liquid that contains sugar (glucose). You don't need to stop eating or drinking before this test. If the test shows that you don't have a lot of sugar in your blood, you don't have gestational diabetes.  Part two (oral glucose tolerance test, or OGTT): If the first test shows a lot of sugar in your blood, then you may have an OGTT. You can't eat or drink for at least 8 hours before this test. A blood sample is taken, then you drink a sweet liquid. You have more blood tests after 1 to 3 hours. If the OGTT shows that you have a lot of sugar in your blood, you may have gestational diabetes.  One-part screening.  Sometimes doctors use the OGTT on its own. If the test shows that you don't have a lot of sugar in your blood, you don't have gestational diabetes. If you do have a lot of sugar in your blood, you may have the condition.  What are the risks of screening?  Your blood glucose level may drop very low toward the end of the test. If this happens, you may feel weak, hungry, and restless. Tell your doctor if you have these symptoms. The test usually will be stopped.  You may vomit after drinking the sweet liquid. If this happens, you may need to take the test at a later time.  Your doctor may do more glucose tests at other times during your pregnancy.  Follow-up care is a key part of your treatment and safety. Be sure to make and go to all appointments, and call your doctor if you are having problems. It's also a good idea  "to know your test results and keep a list of the medicines you take.  Where can you learn more?  Go to https://www.[employer].net/patiented  Enter A472 in the search box to learn more about \"Learning About Screening for Gestational Diabetes.\"  Current as of: 2023               Content Version: 13.8    7650-1991 [employer].   Care instructions adapted under license by your healthcare professional. If you have questions about a medical condition or this instruction, always ask your healthcare professional. [employer] disclaims any warranty or liability for your use of this information.      Weeks 26 to 30 of Your Pregnancy: Care Instructions  You're starting your last trimester. You'll probably feel your baby moving around more. Your back may ache as your body gets used to your baby's size and length. Take care of yourself, and pay attention to what your body needs.    Talk to your doctor about getting the Tdap shot. It will help protect your  against whooping cough (pertussis). Also ask your doctor about flu and COVID-19 shots if you haven't had them yet. If your blood type is Rh negative, you may be given a shot of Rh immune globulin (such as RhoGAM). It can help prevent problems for your baby.   You may have [place] contractions. They are single or several strong contractions without a pattern. These are practice contractions but not the start of labor.   Be kind to yourself.     Take breaks when you're tired.  Change positions often. Don't sit for too long or stand for too long.  At work, rest during breaks if you can. If you don't get breaks, talk to your doctor about writing a letter to your employer to request them.  Avoid fumes, chemicals, and tobacco smoke.  Be sexual if you want to.     You may be interested in sex, or you may not. Everyone is different.  Sex is okay unless your doctor tells you not to.  Your belly can make it hard to find good positions " "for sex. [employer] and explore.  Watch for signs of  labor.    These signs include:   Menstrual-like cramps. Or you may have pain or pressure in your pelvis that happens in a pattern.  About 6 or more contractions in an hour (even after rest and a glass of water).  A low, dull backache that doesn't go away when you change positions.  An increase or change in vaginal discharge.  Light vaginal bleeding or spotting.  Your water breaking.  Know what to do if you think you are having contractions.     Drink 1 or 2 glasses of water.  Lie down on your left side for at least an hour.  While on your side, feel the top of your belly to see if it's tight.  Write down your contractions for an hour. Time how long it is from the start of one contraction to the start of the next.  Call your doctor if you have regular contractions.  Follow-up care is a key part of your treatment and safety. Be sure to make and go to all appointments, and call your doctor if you are having problems. It's also a good idea to know your test results and keep a list of the medicines you take.  Where can you learn more?  Go to https://www.[employer].net/patiented  Enter S999 in the search box to learn more about \"Weeks 26 to 30 of Your Pregnancy: Care Instructions.\"  Current as of: 2023               Content Version: 13.8    7938-9291 [employer].   Care instructions adapted under license by your healthcare professional. If you have questions about a medical condition or this instruction, always ask your healthcare professional. [employer] disclaims any warranty or liability for your use of this information.      Counting Your Baby's Kicks: Care Instructions  Overview     Counting your baby's kicks is one way your doctor can tell that your baby is healthy. You will probably feel your baby move for the first time between 16 and 22 weeks. The movement may feel like flutters rather than kicks. Your baby may move " "more at certain times of the day. When you are active, you may notice less kicking than when you are resting. At your prenatal visits, your doctor will ask whether the baby is active.  In your last trimester, your doctor may ask you to count the number of times you feel your baby move.  Follow-up care is a key part of your treatment and safety. Be sure to make and go to all appointments, and call your doctor if you are having problems. It's also a good idea to know your test results and keep a list of the medicines you take.  How do you count fetal kicks?  A common method of checking your baby's movement is to note the length of time it takes to count 10 movements (such as kicks, flutters, or rolls).  Pick your baby's most active time of day to count. This may be any time from morning to evening.  If you don't feel 10 movements in an hour, have something to eat or drink and count for another hour. If you don't feel at least 10 movements in the 2-hour period, call your doctor.  Do not use an at-home Doppler heart monitor in place of counting fetal movements.  When should you call for help?   Call your doctor now or seek immediate medical care if:    You feel fewer than 10 movements in a 2-hour period.     You noticed that your baby has stopped moving or is moving less than normal.   Watch closely for changes in your health, and be sure to contact your doctor if you have any problems.  Where can you learn more?  Go to https://www.[employer].net/patiented  Enter U048 in the search box to learn more about \"Counting Your Baby's Kicks: Care Instructions.\"  Current as of: July 11, 2023               Content Version: 13.8    5304-3911 [employer].   Care instructions adapted under license by your healthcare professional. If you have questions about a medical condition or this instruction, always ask your healthcare professional. [employer] disclaims any warranty or liability for your use of this " information.

## 2024-02-28 ENCOUNTER — PRENATAL OFFICE VISIT (OUTPATIENT)
Dept: OBGYN | Facility: CLINIC | Age: 34
End: 2024-02-28
Payer: MEDICAID

## 2024-02-28 VITALS — SYSTOLIC BLOOD PRESSURE: 134 MMHG | WEIGHT: 223.6 LBS | DIASTOLIC BLOOD PRESSURE: 64 MMHG | BODY MASS INDEX: 35.02 KG/M2

## 2024-02-28 DIAGNOSIS — Z34.82 ENCOUNTER FOR SUPERVISION OF OTHER NORMAL PREGNANCY IN SECOND TRIMESTER: Primary | ICD-10-CM

## 2024-02-28 LAB
ERYTHROCYTE [DISTWIDTH] IN BLOOD BY AUTOMATED COUNT: ABNORMAL %
GLUCOSE 1H P 50 G GLC PO SERPL-MCNC: 128 MG/DL (ref 70–129)
HCT VFR BLD AUTO: 34.5 % (ref 35–47)
HGB BLD-MCNC: 11.2 G/DL (ref 11.7–15.7)
MCH RBC QN AUTO: 26 PG (ref 26.5–33)
MCHC RBC AUTO-ENTMCNC: 32.5 G/DL (ref 31.5–36.5)
MCV RBC AUTO: 80 FL (ref 78–100)
PLATELET # BLD AUTO: 283 10E3/UL (ref 150–450)
RBC # BLD AUTO: 4.3 10E6/UL (ref 3.8–5.2)
T PALLIDUM AB SER QL: NONREACTIVE
WBC # BLD AUTO: 8.2 10E3/UL (ref 4–11)

## 2024-02-28 PROCEDURE — 99207 PR PRENATAL VISIT: CPT | Performed by: OBSTETRICS & GYNECOLOGY

## 2024-02-28 PROCEDURE — 90471 IMMUNIZATION ADMIN: CPT | Performed by: OBSTETRICS & GYNECOLOGY

## 2024-02-28 PROCEDURE — 86780 TREPONEMA PALLIDUM: CPT | Performed by: OBSTETRICS & GYNECOLOGY

## 2024-02-28 PROCEDURE — 85027 COMPLETE CBC AUTOMATED: CPT | Performed by: OBSTETRICS & GYNECOLOGY

## 2024-02-28 PROCEDURE — 36415 COLL VENOUS BLD VENIPUNCTURE: CPT | Performed by: OBSTETRICS & GYNECOLOGY

## 2024-02-28 PROCEDURE — 82950 GLUCOSE TEST: CPT | Performed by: OBSTETRICS & GYNECOLOGY

## 2024-02-28 PROCEDURE — 90715 TDAP VACCINE 7 YRS/> IM: CPT | Performed by: OBSTETRICS & GYNECOLOGY

## 2024-02-28 RX ORDER — CALCIUM CARBONATE 500 MG/1
1 TABLET, CHEWABLE ORAL 2 TIMES DAILY
COMMUNITY

## 2024-02-28 NOTE — PROGRESS NOTES
34yo  at 28w3d.  ARANZA from CN service as plans delivery at [facility].  1st delivery at [facility] complicated by chorio and prolonged 2nd stage 8lbs oz infant.    GCT/Hgb/Trep and TDaP today.  Baby active.  No OB concerns.  RTC 2 weeks

## 2024-02-28 NOTE — NURSING NOTE
"Chief Complaint   Patient presents with    Prenatal Care   28w3d    initial /64   Wt 101.4 kg (223 lb 9.6 oz)   LMP 08/13/2023 (Exact Date)   BMI 35.02 kg/m   Estimated body mass index is 35.02 kg/m  as calculated from the following:    Height as of 12/7/23: 1.702 m (5' 7\").    Weight as of this encounter: 101.4 kg (223 lb 9.6 oz).  BP completed using cuff size large.  Edie Floyd CMA    "

## 2024-03-11 ENCOUNTER — PRENATAL OFFICE VISIT (OUTPATIENT)
Dept: OBGYN | Facility: CLINIC | Age: 34
End: 2024-03-11
Payer: MEDICAID

## 2024-03-11 VITALS — WEIGHT: 230.6 LBS | BODY MASS INDEX: 36.12 KG/M2 | DIASTOLIC BLOOD PRESSURE: 64 MMHG | SYSTOLIC BLOOD PRESSURE: 124 MMHG

## 2024-03-11 DIAGNOSIS — Z34.93 PRENATAL CARE IN THIRD TRIMESTER: Primary | ICD-10-CM

## 2024-03-11 PROCEDURE — 99207 PR PRENATAL VISIT: CPT | Performed by: FAMILY MEDICINE

## 2024-03-11 NOTE — NURSING NOTE
"30w1d    Chief Complaint   Patient presents with    Prenatal Care       Initial /64   Wt 104.6 kg (230 lb 9.6 oz)   LMP 2023 (Exact Date)   BMI 36.12 kg/m   Estimated body mass index is 36.12 kg/m  as calculated from the following:    Height as of 23: 1.702 m (5' 7\").    Weight as of this encounter: 104.6 kg (230 lb 9.6 oz).  BP completed using cuff size: regular long    Questioned patient about current smoking habits.  Pt. has never smoked.          The following HM Due: NONE    "

## 2024-03-11 NOTE — PATIENT INSTRUCTIONS
"Return 2 weeks  Return to clinic:  every 4 weeks till 28 weeks, then every 2 weeks till 36 weeks, then weekly till delivery    For tour call [phone], tours on Thursdays @ 4:30 and 5:30 pm    Phone numbers Deshaun:  Day/ night [phone] ask for ob triage  Emergency:  Call labor and delivery:  [phone]    What should I call about??    Contraction every 5 minutes for 1 hour 1 minute long (511), bleeding, loss of fluid, headache that doesn't resolve with tylenol, and decreased fetal movement     Start kick counts @ 26-28 weeks   There is an glory for this!  It is called \"count the kicks\"  Keep track of movement and discover your normal baby movement pattern   guideline is listed below  Please call if you do not feel the baby move!  We will have you come in for fetal heart rate monitoring:   Perception of at least 10 FMs during 12 hours of normal maternal activity   Perception of least 10 FMs over two hours when the mother is at rest and focused on [facility] Address   [address], [place] [address] [phone]    Dr. Lor Ruiz, DO    OB/GYN   [facility] and [facility]                                                    "

## 2024-03-11 NOTE — PROGRESS NOTES
CC: Here for routine prenatal visit   33 year old y/o  @ 30w1d with Estimated Date of Delivery: May 19, 2024     /64   Wt 104.6 kg (230 lb 9.6 oz)   LMP 2023 (Exact Date)   BMI 36.12 kg/m    See OB flowsheet  + fetal movement, no contractions, no bleeding, no loss of fluid   Discussed monitoring fetal movement     1) concerns: doing better   2) Routine: Blood type B+ RI tdap [x] flu [dec] GS [dec] Covid v [dec] gtt [nl]  3) Risk factors: low-risk multip  4) Return: 2 weeks    [facility]

## 2024-03-25 ENCOUNTER — PRENATAL OFFICE VISIT (OUTPATIENT)
Dept: OBGYN | Facility: CLINIC | Age: 34
End: 2024-03-25
Payer: MEDICAID

## 2024-03-25 VITALS
DIASTOLIC BLOOD PRESSURE: 72 MMHG | HEIGHT: 67 IN | WEIGHT: 229.1 LBS | SYSTOLIC BLOOD PRESSURE: 116 MMHG | BODY MASS INDEX: 35.96 KG/M2

## 2024-03-25 DIAGNOSIS — O99.891 BACK PAIN AFFECTING PREGNANCY IN THIRD TRIMESTER: ICD-10-CM

## 2024-03-25 DIAGNOSIS — M54.9 BACK PAIN AFFECTING PREGNANCY IN THIRD TRIMESTER: ICD-10-CM

## 2024-03-25 DIAGNOSIS — Z34.93 PRENATAL CARE IN THIRD TRIMESTER: Primary | ICD-10-CM

## 2024-03-25 PROCEDURE — 99207 PR PRENATAL VISIT: CPT | Performed by: FAMILY MEDICINE

## 2024-03-25 NOTE — PATIENT INSTRUCTIONS
"Return 2 weeks   Return to clinic:  every 4 weeks till 28 weeks, then every 2 weeks till 36 weeks, then weekly till delivery    For tour call [phone], tours on Thursdays @ 4:30 and 5:30 pm    Phone numbers Deshaun:  Day/ night [phone] ask for ob triage  Emergency:  Call labor and delivery:  [phone]    What should I call about??    Contraction every 5 minutes for 1 hour 1 minute long (511), bleeding, loss of fluid, headache that doesn't resolve with tylenol, and decreased fetal movement     Start kick counts @ 26-28 weeks   There is an glory for this!  It is called \"count the kicks\"  Keep track of movement and discover your normal baby movement pattern   guideline is listed below  Please call if you do not feel the baby move!  We will have you come in for fetal heart rate monitoring:   Perception of at least 10 FMs during 12 hours of normal maternal activity   Perception of least 10 FMs over two hours when the mother is at rest and focused on [facility] Address   [address], [place] [address] [phone]    Dr. Lor Ruiz, DO    OB/GYN   [facility] and [facility]                                                    "

## 2024-03-25 NOTE — NURSING NOTE
"32w1d    Chief Complaint   Patient presents with    Prenatal Care     Having a lot of back pain       Initial /72   Ht 1.702 m (5' 7\")   Wt 103.9 kg (229 lb 1.6 oz)   LMP 2023 (Exact Date)   BMI 35.88 kg/m   Estimated body mass index is 35.88 kg/m  as calculated from the following:    Height as of this encounter: 1.702 m (5' 7\").    Weight as of this encounter: 103.9 kg (229 lb 1.6 oz).  BP completed using cuff size: regular    Questioned patient about current smoking habits.  Pt. has never smoked.          The following HM Due: NONE      "

## 2024-03-25 NOTE — PROGRESS NOTES
"CC: Here for routine prenatal visit   33 year old y/o  @ 32w1d with Estimated Date of Delivery: May 19, 2024     /72   Ht 1.702 m (5' 7\")   Wt 103.9 kg (229 lb 1.6 oz)   LMP 2023 (Exact Date)   BMI 35.88 kg/m    See OB flowsheet  + fetal movement, no contractions, no bleeding, no loss of fluid   Discussed monitoring fetal movement     1) concerns: baby very active  2) Routine: Blood type B+ RI tdap [x] flu [dec] GS [dec] Covid v [dec] gtt [nl]  3) Risk factors: low-risk multip  4) Return: 2 weeks     Tillaura     "

## 2024-04-11 ENCOUNTER — PRENATAL OFFICE VISIT (OUTPATIENT)
Dept: OBGYN | Facility: CLINIC | Age: 34
End: 2024-04-11
Payer: MEDICAID

## 2024-04-11 VITALS
HEIGHT: 67 IN | BODY MASS INDEX: 36.55 KG/M2 | SYSTOLIC BLOOD PRESSURE: 126 MMHG | DIASTOLIC BLOOD PRESSURE: 70 MMHG | WEIGHT: 232.9 LBS

## 2024-04-11 DIAGNOSIS — Z34.93 PRENATAL CARE IN THIRD TRIMESTER: Primary | ICD-10-CM

## 2024-04-11 PROCEDURE — 99207 PR PRENATAL VISIT: CPT | Performed by: FAMILY MEDICINE

## 2024-04-11 NOTE — PROGRESS NOTES
"CC: Here for routine prenatal visit   33 year old y/o  @ 34w4d with Estimated Date of Delivery: May 19, 2024     /70   Ht 1.702 m (5' 7\")   Wt 105.6 kg (232 lb 14.4 oz)   LMP 2023 (Exact Date)   BMI 36.48 kg/m     See OB flowsheet  + fetal movement, no contractions, no bleeding, no loss of fluid   Discussed monitoring fetal movement     1) concerns: cramping in the legs, suggest powerade/nuum,   Recommend EFW ultrasound   2) Routine: Blood type B+ RI tdap [x] flu [dec] GS [dec] Covid v [dec] gtt [nl]  3) Risk factors: low-risk multip  4) Return: 2 weeks    Tillnagis     "

## 2024-04-11 NOTE — PATIENT INSTRUCTIONS
"Return 2 weeks   For leg cramps powerade 4-5 times weekly     Return to clinic:  every 4 weeks till 28 weeks, then every 2 weeks till 36 weeks, then weekly till delivery    For tour call [phone], tours on Thursdays @ 4:30 and 5:30 pm    Phone numbers Deshaun:  Day/ night [phone] ask for ob triage  Emergency:  Call labor and delivery:  [phone]    What should I call about??    Contraction every 5 minutes for 1 hour 1 minute long (511), bleeding, loss of fluid, headache that doesn't resolve with tylenol, and decreased fetal movement     Start kick counts @ 26-28 weeks   There is an glory for this!  It is called \"count the kicks\"  Keep track of movement and discover your normal baby movement pattern   guideline is listed below  Please call if you do not feel the baby move!  We will have you come in for fetal heart rate monitoring:   Perception of at least 10 FMs during 12 hours of normal maternal activity   Perception of least 10 FMs over two hours when the mother is at rest and focused on [facility] Address   [address], [place] [address] [phone]    Dr. Lor Ruiz, DO    OB/GYN   [facility] and [facility]                                                    "

## 2024-04-11 NOTE — NURSING NOTE
"34w4d    Chief Complaint   Patient presents with    Prenatal Care     Getting leg cramps mostly at night       Initial /70   Ht 1.702 m (5' 7\")   Wt 105.6 kg (232 lb 14.4 oz)   LMP 2023 (Exact Date)   BMI 36.48 kg/m   Estimated body mass index is 36.48 kg/m  as calculated from the following:    Height as of this encounter: 1.702 m (5' 7\").    Weight as of this encounter: 105.6 kg (232 lb 14.4 oz).  BP completed using cuff size: regular    Questioned patient about current smoking habits.  Pt. has never smoked.          The following HM Due: NONE    "

## 2024-04-16 ENCOUNTER — ANCILLARY PROCEDURE (OUTPATIENT)
Dept: ULTRASOUND IMAGING | Facility: CLINIC | Age: 34
End: 2024-04-16
Attending: FAMILY MEDICINE
Payer: MEDICAID

## 2024-04-16 DIAGNOSIS — Z34.93 PRENATAL CARE IN THIRD TRIMESTER: ICD-10-CM

## 2024-04-16 PROCEDURE — 76816 OB US FOLLOW-UP PER FETUS: CPT | Performed by: OBSTETRICS & GYNECOLOGY

## 2024-04-22 ENCOUNTER — PRENATAL OFFICE VISIT (OUTPATIENT)
Dept: OBGYN | Facility: CLINIC | Age: 34
End: 2024-04-22
Payer: MEDICAID

## 2024-04-22 VITALS
HEIGHT: 67 IN | BODY MASS INDEX: 37.54 KG/M2 | DIASTOLIC BLOOD PRESSURE: 78 MMHG | WEIGHT: 239.2 LBS | SYSTOLIC BLOOD PRESSURE: 120 MMHG

## 2024-04-22 DIAGNOSIS — Z34.93 PRENATAL CARE IN THIRD TRIMESTER: Primary | ICD-10-CM

## 2024-04-22 PROCEDURE — 87653 STREP B DNA AMP PROBE: CPT | Performed by: FAMILY MEDICINE

## 2024-04-22 PROCEDURE — 99207 PR PRENATAL VISIT: CPT | Performed by: FAMILY MEDICINE

## 2024-04-22 NOTE — PATIENT INSTRUCTIONS
"Return weekly   Return to clinic:  every 4 weeks till 28 weeks, then every 2 weeks till 36 weeks, then weekly till delivery  Please schedule out a few ob visits at a time so that you can get an appointment as you would like.     For tour call [phone], tours on Thursdays @ 4:30 and 5:30 pm    Phone numbers Deshaun:  Day/ night [phone] ask for ob triage  Emergency:  Call labor and delivery:  [phone]    What should I call about??    Contraction every 5 minutes for 1 hour 1 minute long (511), bleeding, loss of fluid, headache that doesn't resolve with tylenol, and decreased fetal movement     Start kick counts @ 26-28 weeks   There is an glory for this!  It is called \"count the kicks\"  Keep track of movement and discover your normal baby movement pattern   guideline is listed below  Please call if you do not feel the baby move!  We will have you come in for fetal heart rate monitoring:   Perception of at least 10 FMs during 12 hours of normal maternal activity   Perception of least 10 FMs over two hours when the mother is at rest and focused on [facility] Address   [address] [facility], [place], [place] [id number] [phone]    Dr. Lor Ruiz, DO    OB/GYN   [facility] and [facility]                                                    "

## 2024-04-22 NOTE — PROGRESS NOTES
"CC: Here for routine prenatal visit   33 year old y/o  @ 36w1d with Estimated Date of Delivery: May 19, 2024     /78   Ht 1.702 m (5' 7\")   Wt 108.5 kg (239 lb 3.2 oz)   LMP 2023 (Exact Date)   BMI 37.46 kg/m    See OB flowsheet  + fetal movement, no contractions, no bleeding, no loss of fluid   Discussed monitoring fetal movement     1) concerns:  doing well  2) Routine: Blood type B+ RI tdap [x] flu [dec] GS [dec] Covid v [dec] gtt [nl]  3) Risk factors: low-risk multip    4) Return: weekly     Tillaura     "

## 2024-04-22 NOTE — NURSING NOTE
"36w1d    Chief Complaint   Patient presents with    Prenatal Care     GBS due--needs written breast pump Rx       Initial /78   Ht 1.702 m (5' 7\")   Wt 108.5 kg (239 lb 3.2 oz)   LMP 2023 (Exact Date)   BMI 37.46 kg/m   Estimated body mass index is 37.46 kg/m  as calculated from the following:    Height as of this encounter: 1.702 m (5' 7\").    Weight as of this encounter: 108.5 kg (239 lb 3.2 oz).  BP completed using cuff size: regular    Questioned patient about current smoking habits.  Pt. has never smoked.          The following HM Due: NONE    "

## 2024-04-23 LAB — GP B STREP DNA SPEC QL NAA+PROBE: NEGATIVE

## 2024-05-02 ENCOUNTER — PRENATAL OFFICE VISIT (OUTPATIENT)
Dept: OBGYN | Facility: CLINIC | Age: 34
End: 2024-05-02
Payer: MEDICAID

## 2024-05-02 VITALS
DIASTOLIC BLOOD PRESSURE: 78 MMHG | SYSTOLIC BLOOD PRESSURE: 126 MMHG | BODY MASS INDEX: 37.37 KG/M2 | HEIGHT: 67 IN | WEIGHT: 238.1 LBS

## 2024-05-02 DIAGNOSIS — K21.00 GASTROESOPHAGEAL REFLUX DISEASE WITH ESOPHAGITIS WITHOUT HEMORRHAGE: Primary | ICD-10-CM

## 2024-05-02 PROCEDURE — 99207 PR PRENATAL VISIT: CPT | Performed by: FAMILY MEDICINE

## 2024-05-02 RX ORDER — OMEPRAZOLE 40 MG/1
40 CAPSULE, DELAYED RELEASE ORAL DAILY
Qty: 90 CAPSULE | Refills: 3 | Status: SHIPPED | OUTPATIENT
Start: 2024-05-02

## 2024-05-02 NOTE — PROGRESS NOTES
"CC: Here for routine prenatal visit   34 year old y/o  @ 37w4d with Estimated Date of Delivery: May 19, 2024     /78   Ht 1.702 m (5' 7\")   Wt 108 kg (238 lb 1.6 oz)   LMP 2023 (Exact Date)   BMI 37.29 kg/m    See OB flowsheet  + fetal movement, no contractions, no bleeding, no loss of fluid   Discussed monitoring fetal movement     1) concerns: GERD rx omeprazole daily or with highly irritating food.   First baby 8#, this baby normal growth, 6# 8 oz, projected growth @ 39 weeks will be about 8 1/2 pounds.   2) Routine: Blood type B+ RI tdap [x] flu [dec] GS [dec] Covid v [dec] gtt [nl]  3) Risk factors: low-risk multip  4) Return: weekly     [place]     "

## 2024-05-02 NOTE — NURSING NOTE
"37w4d    Chief Complaint   Patient presents with    Prenatal Care       Initial /78   Ht 1.702 m (5' 7\")   Wt 108 kg (238 lb 1.6 oz)   LMP 2023 (Exact Date)   BMI 37.29 kg/m   Estimated body mass index is 37.29 kg/m  as calculated from the following:    Height as of this encounter: 1.702 m (5' 7\").    Weight as of this encounter: 108 kg (238 lb 1.6 oz).  BP completed using cuff size: regular    Questioned patient about current smoking habits.  Pt. has never smoked.          The following HM Due: NONE    "

## 2024-05-02 NOTE — PATIENT INSTRUCTIONS
"Return weekly   Return to clinic:  every 4 weeks till 28 weeks, then every 2 weeks till 36 weeks, then weekly till delivery  Please schedule out a few ob visits at a time so that you can get an appointment as you would like.     For tour call [phone], tours on Thursdays @ 4:30 and 5:30 pm    Phone numbers Deshaun:  Day/ night [phone] ask for ob triage  Emergency:  Call labor and delivery:  [phone]    What should I call about??    Contraction every 5 minutes for 1 hour 1 minute long (511), bleeding, loss of fluid, headache that doesn't resolve with tylenol, and decreased fetal movement     Start kick counts @ 26-28 weeks   There is an glory for this!  It is called \"count the kicks\"  Keep track of movement and discover your normal baby movement pattern   guideline is listed below  Please call if you do not feel the baby move!  We will have you come in for fetal heart rate monitoring:   Perception of at least 10 FMs during 12 hours of normal maternal activity   Perception of least 10 FMs over two hours when the mother is at rest and focused on [facility] Address   [address] [facility], [place], [place] [id number] [phone]    Dr. Lor Ruiz, DO    OB/GYN   [facility] and [facility]                                                    "

## 2024-05-09 ENCOUNTER — PRENATAL OFFICE VISIT (OUTPATIENT)
Dept: OBGYN | Facility: CLINIC | Age: 34
End: 2024-05-09
Payer: MEDICAID

## 2024-05-09 VITALS
BODY MASS INDEX: 37.75 KG/M2 | DIASTOLIC BLOOD PRESSURE: 70 MMHG | SYSTOLIC BLOOD PRESSURE: 118 MMHG | WEIGHT: 240.5 LBS | HEIGHT: 67 IN

## 2024-05-09 DIAGNOSIS — Z34.93 PRENATAL CARE IN THIRD TRIMESTER: Primary | ICD-10-CM

## 2024-05-09 PROCEDURE — 99207 PR PRENATAL VISIT: CPT | Performed by: FAMILY MEDICINE

## 2024-05-09 NOTE — NURSING NOTE
"38w4d    Chief Complaint   Patient presents with    Prenatal Care       Initial /70   Ht 1.702 m (5' 7\")   Wt 109.1 kg (240 lb 8 oz)   LMP 2023 (Exact Date)   BMI 37.67 kg/m   Estimated body mass index is 37.67 kg/m  as calculated from the following:    Height as of this encounter: 1.702 m (5' 7\").    Weight as of this encounter: 109.1 kg (240 lb 8 oz).  BP completed using cuff size: regular    Questioned patient about current smoking habits.  Pt. has never smoked.          The following HM Due: NONE    "

## 2024-05-09 NOTE — PROGRESS NOTES
"CC: Here for routine prenatal visit   34 year old y/o  @ 38w4d with Estimated Date of Delivery: May 19, 2024     /70   Ht 1.702 m (5' 7\")   Wt 109.1 kg (240 lb 8 oz)   LMP 2023 (Exact Date)   BMI 37.67 kg/m    See OB flowsheet  + fetal movement, no contractions, no bleeding, no loss of fluid   Discussed monitoring fetal movement     1) concerns:  Discussed IOL, hess score 7, favorable, can be done @ or after 39 weeks.    May consider IOL after 39 weeks and will call back   First baby 8#, this baby normal growth, 6# 8 oz, projected growth @ 39 weeks will be about 8 1/2 pounds.   Hx of    2) Routine: Blood type B+ RI tdap [x] flu [dec] GS [dec] Covid v [dec] gtt [nl] GBS [neg}]  3) Risk factors: low-risk multip    4) Return: weekly, will call back if desires IOL     Joseph     "

## 2024-05-09 NOTE — PATIENT INSTRUCTIONS
"Return weekly, call for IOL if desires   Return to clinic:  every 4 weeks till 28 weeks, then every 2 weeks till 36 weeks, then weekly till delivery  Please schedule out a few ob visits at a time so that you can get an appointment as you would like.     For tour call [phone], tours on Thursdays @ 4:30 and 5:30 pm    Phone numbers Deshaun:  Day/ night [phone] ask for ob triage  Emergency:  Call labor and delivery:  [phone]    What should I call about??    Contraction every 5 minutes for 1 hour 1 minute long (511), bleeding, loss of fluid, headache that doesn't resolve with tylenol, and decreased fetal movement     Start kick counts @ 26-28 weeks   There is an glory for this!  It is called \"count the kicks\"  Keep track of movement and discover your normal baby movement pattern   guideline is listed below  Please call if you do not feel the baby move!  We will have you come in for fetal heart rate monitoring:   Perception of at least 10 FMs during 12 hours of normal maternal activity   Perception of least 10 FMs over two hours when the mother is at rest and focused on [facility] Address   [address], [place] [address] [phone]    Dr. Lor Ruiz, DO    OB/GYN   [facility] and [facility]                                                    "

## 2024-05-16 ENCOUNTER — ANCILLARY PROCEDURE (OUTPATIENT)
Dept: ULTRASOUND IMAGING | Facility: CLINIC | Age: 34
End: 2024-05-16
Attending: FAMILY MEDICINE
Payer: MEDICAID

## 2024-05-16 ENCOUNTER — PRENATAL OFFICE VISIT (OUTPATIENT)
Dept: OBGYN | Facility: CLINIC | Age: 34
End: 2024-05-16
Payer: MEDICAID

## 2024-05-16 ENCOUNTER — MEDICAL CORRESPONDENCE (OUTPATIENT)
Dept: HEALTH INFORMATION MANAGEMENT | Facility: CLINIC | Age: 34
End: 2024-05-16

## 2024-05-16 VITALS
WEIGHT: 243.7 LBS | BODY MASS INDEX: 38.25 KG/M2 | DIASTOLIC BLOOD PRESSURE: 80 MMHG | SYSTOLIC BLOOD PRESSURE: 124 MMHG | HEIGHT: 67 IN

## 2024-05-16 DIAGNOSIS — Z34.93 PRENATAL CARE IN THIRD TRIMESTER: ICD-10-CM

## 2024-05-16 DIAGNOSIS — Z34.93 PRENATAL CARE IN THIRD TRIMESTER: Primary | ICD-10-CM

## 2024-05-16 DIAGNOSIS — J30.1 SEASONAL ALLERGIC RHINITIS DUE TO POLLEN: ICD-10-CM

## 2024-05-16 PROCEDURE — 99207 PR PRENATAL VISIT: CPT | Performed by: FAMILY MEDICINE

## 2024-05-16 PROCEDURE — 76816 OB US FOLLOW-UP PER FETUS: CPT | Performed by: FAMILY MEDICINE

## 2024-05-16 RX ORDER — LORATADINE 10 MG/1
10 TABLET ORAL DAILY
Qty: 90 TABLET | Refills: 3 | Status: SHIPPED | OUTPATIENT
Start: 2024-05-16

## 2024-05-16 NOTE — PROGRESS NOTES
"CC: Here for routine prenatal visit   34 year old y/o  @ 39w4d with Estimated Date of Delivery: May 19, 2024     /80   Ht 1.702 m (5' 7\")   Wt 110.5 kg (243 lb 11.2 oz)   LMP 2023 (Exact Date)   BMI 38.17 kg/m    See OB flowsheet  + fetal movement, no contractions, no bleeding, no loss of fluid   Discussed monitoring fetal movement     1) concerns: desires IOL sat or    May consider IOL after 39 weeks and will call back   First baby 8#, this baby normal growth, 6# 8 oz, projected growth @ 39 weeks will be about 8 1/2 pounds.   Hx of    2) Routine: Blood type B+ RI tdap [x] flu [dec] GS [dec] Covid v [dec] gtt [nl] GBS [neg}]  3) Risk factors: low-risk multip    4) Return: will schedule IOL sat or      [place]     "

## 2024-05-16 NOTE — NURSING NOTE
"39w4d    Chief Complaint   Patient presents with    Prenatal Care     Discuss induction       Initial /80   Ht 1.702 m (5' 7\")   Wt 110.5 kg (243 lb 11.2 oz)   LMP 2023 (Exact Date)   BMI 38.17 kg/m   Estimated body mass index is 38.17 kg/m  as calculated from the following:    Height as of this encounter: 1.702 m (5' 7\").    Weight as of this encounter: 110.5 kg (243 lb 11.2 oz).  BP completed using cuff size: regular    Questioned patient about current smoking habits.  Pt. has never smoked.          The following HM Due: NONE      "

## 2024-05-16 NOTE — PATIENT INSTRUCTIONS
"Return for IOL     Call [phone] on sat or sunday morning  at 0630 am   To plan to come to the hospital at 0730 for your induction!!!     Return for IOL   Return to clinic:  every 4 weeks till 28 weeks, then every 2 weeks till 36 weeks, then weekly till delivery  Please schedule out a few ob visits at a time so that you can get an appointment as you would like.     For tour call [phone], tours on Thursdays @ 4:30 and 5:30 pm    Phone numbers Deshaun:  Day/ night [phone] ask for ob triage  Emergency:  Call labor and delivery:  [phone]    What should I call about??    Contraction every 5 minutes for 1 hour 1 minute long (511), bleeding, loss of fluid, headache that doesn't resolve with tylenol, and decreased fetal movement     Start kick counts @ 26-28 weeks   There is an glory for this!  It is called \"count the kicks\"  Keep track of movement and discover your normal baby movement pattern   guideline is listed below  Please call if you do not feel the baby move!  We will have you come in for fetal heart rate monitoring:   Perception of at least 10 FMs during 12 hours of normal maternal activity   Perception of least 10 FMs over two hours when the mother is at rest and focused on [facility] Address   [address], [place] [address] [phone]    Dr. Lor Ruiz, DO    OB/GYN   [facility] and [facility]                                                    "

## 2024-05-16 NOTE — NURSING NOTE
Pt scheduled for induction on 5/18/24. Pt given induction instructions in clinic. Advised to call L/D at 6:30am. Telephone number given.    Delia Barrios CMA

## 2024-05-18 ENCOUNTER — ANESTHESIA (OUTPATIENT)
Dept: OBGYN | Facility: CLINIC | Age: 34
End: 2024-05-18
Payer: MEDICAID

## 2024-05-18 ENCOUNTER — HOSPITAL ENCOUNTER (INPATIENT)
Facility: CLINIC | Age: 34
LOS: 1 days | Discharge: HOME | End: 2024-05-19
Attending: OBSTETRICS & GYNECOLOGY | Admitting: OBSTETRICS & GYNECOLOGY
Payer: MEDICAID

## 2024-05-18 ENCOUNTER — ANESTHESIA EVENT (OUTPATIENT)
Dept: OBGYN | Facility: CLINIC | Age: 34
End: 2024-05-18
Payer: MEDICAID

## 2024-05-18 LAB
ABO/RH(D): NORMAL
ANTIBODY SCREEN: NEGATIVE
ERYTHROCYTE [DISTWIDTH] IN BLOOD BY AUTOMATED COUNT: 16.4 % (ref 10–15)
HCT VFR BLD AUTO: 38 % (ref 35–47)
HGB BLD-MCNC: 12.5 G/DL (ref 11.7–15.7)
MCH RBC QN AUTO: 26.5 PG (ref 26.5–33)
MCHC RBC AUTO-ENTMCNC: 32.9 G/DL (ref 31.5–36.5)
MCV RBC AUTO: 81 FL (ref 78–100)
PLATELET # BLD AUTO: 250 10E3/UL (ref 150–450)
RBC # BLD AUTO: 4.71 10E6/UL (ref 3.8–5.2)
SPECIMEN EXPIRATION DATE: NORMAL
T PALLIDUM AB SER QL: NONREACTIVE
WBC # BLD AUTO: 5.8 10E3/UL (ref 4–11)

## 2024-05-18 PROCEDURE — 370N000003 HC ANESTHESIA WARD SERVICE: Performed by: ANESTHESIOLOGY

## 2024-05-18 PROCEDURE — 85027 COMPLETE CBC AUTOMATED: CPT | Performed by: OBSTETRICS & GYNECOLOGY

## 2024-05-18 PROCEDURE — 3E033VJ INTRODUCTION OF OTHER HORMONE INTO PERIPHERAL VEIN, PERCUTANEOUS APPROACH: ICD-10-PCS | Performed by: OBSTETRICS & GYNECOLOGY

## 2024-05-18 PROCEDURE — 250N000011 HC RX IP 250 OP 636: Mod: JZ | Performed by: OBSTETRICS & GYNECOLOGY

## 2024-05-18 PROCEDURE — 250N000013 HC RX MED GY IP 250 OP 250 PS 637: Performed by: OBSTETRICS & GYNECOLOGY

## 2024-05-18 PROCEDURE — 3E0R3BZ INTRODUCTION OF ANESTHETIC AGENT INTO SPINAL CANAL, PERCUTANEOUS APPROACH: ICD-10-PCS | Performed by: ANESTHESIOLOGY

## 2024-05-18 PROCEDURE — 722N000001 HC LABOR CARE VAGINAL DELIVERY SINGLE

## 2024-05-18 PROCEDURE — 86780 TREPONEMA PALLIDUM: CPT | Performed by: OBSTETRICS & GYNECOLOGY

## 2024-05-18 PROCEDURE — 120N000013 HC R&B IMCU

## 2024-05-18 PROCEDURE — 00HU33Z INSERTION OF INFUSION DEVICE INTO SPINAL CANAL, PERCUTANEOUS APPROACH: ICD-10-PCS | Performed by: ANESTHESIOLOGY

## 2024-05-18 PROCEDURE — 10907ZC DRAINAGE OF AMNIOTIC FLUID, THERAPEUTIC FROM PRODUCTS OF CONCEPTION, VIA NATURAL OR ARTIFICIAL OPENING: ICD-10-PCS | Performed by: OBSTETRICS & GYNECOLOGY

## 2024-05-18 PROCEDURE — 258N000003 HC RX IP 258 OP 636: Performed by: OBSTETRICS & GYNECOLOGY

## 2024-05-18 PROCEDURE — 59400 OBSTETRICAL CARE: CPT | Performed by: OBSTETRICS & GYNECOLOGY

## 2024-05-18 PROCEDURE — 250N000009 HC RX 250: Performed by: OBSTETRICS & GYNECOLOGY

## 2024-05-18 PROCEDURE — 86900 BLOOD TYPING SEROLOGIC ABO: CPT | Performed by: OBSTETRICS & GYNECOLOGY

## 2024-05-18 PROCEDURE — 250N000011 HC RX IP 250 OP 636: Performed by: ANESTHESIOLOGY

## 2024-05-18 RX ORDER — LOPERAMIDE HCL 2 MG
2 CAPSULE ORAL
Status: DISCONTINUED | OUTPATIENT
Start: 2024-05-18 | End: 2024-05-18 | Stop reason: HOSPADM

## 2024-05-18 RX ORDER — METHYLERGONOVINE MALEATE 0.2 MG/ML
200 INJECTION INTRAVENOUS
Status: DISCONTINUED | OUTPATIENT
Start: 2024-05-18 | End: 2024-05-19 | Stop reason: HOSPADM

## 2024-05-18 RX ORDER — SODIUM CHLORIDE, SODIUM LACTATE, POTASSIUM CHLORIDE, CALCIUM CHLORIDE 600; 310; 30; 20 MG/100ML; MG/100ML; MG/100ML; MG/100ML
INJECTION, SOLUTION INTRAVENOUS CONTINUOUS
Status: DISCONTINUED | OUTPATIENT
Start: 2024-05-18 | End: 2024-05-18 | Stop reason: HOSPADM

## 2024-05-18 RX ORDER — MODIFIED LANOLIN
OINTMENT (GRAM) TOPICAL
Status: DISCONTINUED | OUTPATIENT
Start: 2024-05-18 | End: 2024-05-19 | Stop reason: HOSPADM

## 2024-05-18 RX ORDER — SODIUM CHLORIDE, SODIUM LACTATE, POTASSIUM CHLORIDE, CALCIUM CHLORIDE 600; 310; 30; 20 MG/100ML; MG/100ML; MG/100ML; MG/100ML
INJECTION, SOLUTION INTRAVENOUS CONTINUOUS PRN
Status: DISCONTINUED | OUTPATIENT
Start: 2024-05-18 | End: 2024-05-18 | Stop reason: HOSPADM

## 2024-05-18 RX ORDER — PROCHLORPERAZINE 25 MG
25 SUPPOSITORY, RECTAL RECTAL EVERY 12 HOURS PRN
Status: DISCONTINUED | OUTPATIENT
Start: 2024-05-18 | End: 2024-05-18 | Stop reason: HOSPADM

## 2024-05-18 RX ORDER — METOCLOPRAMIDE HYDROCHLORIDE 5 MG/ML
10 INJECTION INTRAMUSCULAR; INTRAVENOUS EVERY 6 HOURS PRN
Status: DISCONTINUED | OUTPATIENT
Start: 2024-05-18 | End: 2024-05-18 | Stop reason: HOSPADM

## 2024-05-18 RX ORDER — LOPERAMIDE HCL 2 MG
4 CAPSULE ORAL
Status: DISCONTINUED | OUTPATIENT
Start: 2024-05-18 | End: 2024-05-19 | Stop reason: HOSPADM

## 2024-05-18 RX ORDER — NALOXONE HYDROCHLORIDE 0.4 MG/ML
0.2 INJECTION, SOLUTION INTRAMUSCULAR; INTRAVENOUS; SUBCUTANEOUS
Status: DISCONTINUED | OUTPATIENT
Start: 2024-05-18 | End: 2024-05-18 | Stop reason: HOSPADM

## 2024-05-18 RX ORDER — FENTANYL CITRATE 50 UG/ML
100 INJECTION, SOLUTION INTRAMUSCULAR; INTRAVENOUS
Status: DISCONTINUED | OUTPATIENT
Start: 2024-05-18 | End: 2024-05-18 | Stop reason: HOSPADM

## 2024-05-18 RX ORDER — NALBUPHINE HYDROCHLORIDE 20 MG/ML
2.5-5 INJECTION, SOLUTION INTRAMUSCULAR; INTRAVENOUS; SUBCUTANEOUS EVERY 6 HOURS PRN
Status: DISCONTINUED | OUTPATIENT
Start: 2024-05-18 | End: 2024-05-19

## 2024-05-18 RX ORDER — NALOXONE HYDROCHLORIDE 0.4 MG/ML
0.4 INJECTION, SOLUTION INTRAMUSCULAR; INTRAVENOUS; SUBCUTANEOUS
Status: DISCONTINUED | OUTPATIENT
Start: 2024-05-18 | End: 2024-05-18 | Stop reason: HOSPADM

## 2024-05-18 RX ORDER — OXYTOCIN/0.9 % SODIUM CHLORIDE 30/500 ML
1-24 PLASTIC BAG, INJECTION (ML) INTRAVENOUS CONTINUOUS
Status: DISCONTINUED | OUTPATIENT
Start: 2024-05-18 | End: 2024-05-18 | Stop reason: HOSPADM

## 2024-05-18 RX ORDER — IBUPROFEN 800 MG/1
800 TABLET, FILM COATED ORAL EVERY 6 HOURS PRN
Status: DISCONTINUED | OUTPATIENT
Start: 2024-05-18 | End: 2024-05-19 | Stop reason: HOSPADM

## 2024-05-18 RX ORDER — MISOPROSTOL 200 UG/1
400 TABLET ORAL
Status: DISCONTINUED | OUTPATIENT
Start: 2024-05-18 | End: 2024-05-19 | Stop reason: HOSPADM

## 2024-05-18 RX ORDER — METHYLERGONOVINE MALEATE 0.2 MG/ML
200 INJECTION INTRAVENOUS
Status: DISCONTINUED | OUTPATIENT
Start: 2024-05-18 | End: 2024-05-18 | Stop reason: HOSPADM

## 2024-05-18 RX ORDER — MISOPROSTOL 200 UG/1
800 TABLET ORAL
Status: DISCONTINUED | OUTPATIENT
Start: 2024-05-18 | End: 2024-05-19 | Stop reason: HOSPADM

## 2024-05-18 RX ORDER — OXYTOCIN/0.9 % SODIUM CHLORIDE 30/500 ML
340 PLASTIC BAG, INJECTION (ML) INTRAVENOUS CONTINUOUS PRN
Status: DISCONTINUED | OUTPATIENT
Start: 2024-05-18 | End: 2024-05-18 | Stop reason: HOSPADM

## 2024-05-18 RX ORDER — HYDROCORTISONE 25 MG/G
CREAM TOPICAL 3 TIMES DAILY PRN
Status: DISCONTINUED | OUTPATIENT
Start: 2024-05-18 | End: 2024-05-19 | Stop reason: HOSPADM

## 2024-05-18 RX ORDER — OXYTOCIN 10 [USP'U]/ML
10 INJECTION, SOLUTION INTRAMUSCULAR; INTRAVENOUS
Status: DISCONTINUED | OUTPATIENT
Start: 2024-05-18 | End: 2024-05-19 | Stop reason: HOSPADM

## 2024-05-18 RX ORDER — MISOPROSTOL 200 UG/1
800 TABLET ORAL
Status: DISCONTINUED | OUTPATIENT
Start: 2024-05-18 | End: 2024-05-18 | Stop reason: HOSPADM

## 2024-05-18 RX ORDER — PROCHLORPERAZINE MALEATE 10 MG
10 TABLET ORAL EVERY 6 HOURS PRN
Status: DISCONTINUED | OUTPATIENT
Start: 2024-05-18 | End: 2024-05-18 | Stop reason: HOSPADM

## 2024-05-18 RX ORDER — OXYTOCIN/0.9 % SODIUM CHLORIDE 30/500 ML
100-340 PLASTIC BAG, INJECTION (ML) INTRAVENOUS CONTINUOUS PRN
Status: DISCONTINUED | OUTPATIENT
Start: 2024-05-18 | End: 2024-05-19

## 2024-05-18 RX ORDER — KETOROLAC TROMETHAMINE 30 MG/ML
30 INJECTION, SOLUTION INTRAMUSCULAR; INTRAVENOUS
Status: COMPLETED | OUTPATIENT
Start: 2024-05-18 | End: 2024-05-18

## 2024-05-18 RX ORDER — ONDANSETRON 2 MG/ML
4 INJECTION INTRAMUSCULAR; INTRAVENOUS EVERY 6 HOURS PRN
Status: DISCONTINUED | OUTPATIENT
Start: 2024-05-18 | End: 2024-05-18 | Stop reason: HOSPADM

## 2024-05-18 RX ORDER — ONDANSETRON 4 MG/1
4 TABLET, ORALLY DISINTEGRATING ORAL EVERY 6 HOURS PRN
Status: DISCONTINUED | OUTPATIENT
Start: 2024-05-18 | End: 2024-05-18 | Stop reason: HOSPADM

## 2024-05-18 RX ORDER — IBUPROFEN 800 MG/1
800 TABLET, FILM COATED ORAL
Status: COMPLETED | OUTPATIENT
Start: 2024-05-18 | End: 2024-05-18

## 2024-05-18 RX ORDER — OXYTOCIN 10 [USP'U]/ML
10 INJECTION, SOLUTION INTRAMUSCULAR; INTRAVENOUS
Status: DISCONTINUED | OUTPATIENT
Start: 2024-05-18 | End: 2024-05-19

## 2024-05-18 RX ORDER — BISACODYL 10 MG
10 SUPPOSITORY, RECTAL RECTAL DAILY PRN
Status: DISCONTINUED | OUTPATIENT
Start: 2024-05-18 | End: 2024-05-19 | Stop reason: HOSPADM

## 2024-05-18 RX ORDER — OXYTOCIN 10 [USP'U]/ML
10 INJECTION, SOLUTION INTRAMUSCULAR; INTRAVENOUS
Status: DISCONTINUED | OUTPATIENT
Start: 2024-05-18 | End: 2024-05-18 | Stop reason: HOSPADM

## 2024-05-18 RX ORDER — METOCLOPRAMIDE 10 MG/1
10 TABLET ORAL EVERY 6 HOURS PRN
Status: DISCONTINUED | OUTPATIENT
Start: 2024-05-18 | End: 2024-05-18 | Stop reason: HOSPADM

## 2024-05-18 RX ORDER — CITRIC ACID/SODIUM CITRATE 334-500MG
30 SOLUTION, ORAL ORAL
Status: DISCONTINUED | OUTPATIENT
Start: 2024-05-18 | End: 2024-05-18 | Stop reason: HOSPADM

## 2024-05-18 RX ORDER — CARBOPROST TROMETHAMINE 250 UG/ML
250 INJECTION, SOLUTION INTRAMUSCULAR
Status: DISCONTINUED | OUTPATIENT
Start: 2024-05-18 | End: 2024-05-19 | Stop reason: HOSPADM

## 2024-05-18 RX ORDER — DOCUSATE SODIUM 100 MG/1
100 CAPSULE, LIQUID FILLED ORAL DAILY
Status: DISCONTINUED | OUTPATIENT
Start: 2024-05-18 | End: 2024-05-19 | Stop reason: HOSPADM

## 2024-05-18 RX ORDER — TRANEXAMIC ACID 10 MG/ML
1 INJECTION, SOLUTION INTRAVENOUS EVERY 30 MIN PRN
Status: DISCONTINUED | OUTPATIENT
Start: 2024-05-18 | End: 2024-05-19 | Stop reason: HOSPADM

## 2024-05-18 RX ORDER — OXYTOCIN/0.9 % SODIUM CHLORIDE 30/500 ML
100 PLASTIC BAG, INJECTION (ML) INTRAVENOUS CONTINUOUS
Status: DISCONTINUED | OUTPATIENT
Start: 2024-05-18 | End: 2024-05-19 | Stop reason: HOSPADM

## 2024-05-18 RX ORDER — CEFAZOLIN SODIUM/WATER 2 G/20 ML
2 SYRINGE (ML) INTRAVENOUS ONCE
Status: COMPLETED | OUTPATIENT
Start: 2024-05-18 | End: 2024-05-18

## 2024-05-18 RX ORDER — LOPERAMIDE HCL 2 MG
4 CAPSULE ORAL
Status: DISCONTINUED | OUTPATIENT
Start: 2024-05-18 | End: 2024-05-18 | Stop reason: HOSPADM

## 2024-05-18 RX ORDER — LIDOCAINE 40 MG/G
CREAM TOPICAL
Status: DISCONTINUED | OUTPATIENT
Start: 2024-05-18 | End: 2024-05-18 | Stop reason: HOSPADM

## 2024-05-18 RX ORDER — MISOPROSTOL 200 UG/1
400 TABLET ORAL
Status: DISCONTINUED | OUTPATIENT
Start: 2024-05-18 | End: 2024-05-18 | Stop reason: HOSPADM

## 2024-05-18 RX ORDER — LOPERAMIDE HCL 2 MG
2 CAPSULE ORAL
Status: DISCONTINUED | OUTPATIENT
Start: 2024-05-18 | End: 2024-05-19 | Stop reason: HOSPADM

## 2024-05-18 RX ORDER — FENTANYL CITRATE-0.9 % NACL/PF 10 MCG/ML
100 PLASTIC BAG, INJECTION (ML) INTRAVENOUS EVERY 5 MIN PRN
Status: DISCONTINUED | OUTPATIENT
Start: 2024-05-18 | End: 2024-05-18 | Stop reason: HOSPADM

## 2024-05-18 RX ORDER — TRANEXAMIC ACID 10 MG/ML
1 INJECTION, SOLUTION INTRAVENOUS EVERY 30 MIN PRN
Status: DISCONTINUED | OUTPATIENT
Start: 2024-05-18 | End: 2024-05-18 | Stop reason: HOSPADM

## 2024-05-18 RX ORDER — ACETAMINOPHEN 325 MG/1
650 TABLET ORAL EVERY 4 HOURS PRN
Status: DISCONTINUED | OUTPATIENT
Start: 2024-05-18 | End: 2024-05-19 | Stop reason: HOSPADM

## 2024-05-18 RX ORDER — CARBOPROST TROMETHAMINE 250 UG/ML
250 INJECTION, SOLUTION INTRAMUSCULAR
Status: DISCONTINUED | OUTPATIENT
Start: 2024-05-18 | End: 2024-05-18 | Stop reason: HOSPADM

## 2024-05-18 RX ADMIN — METHYLERGONOVINE MALEATE 200 MCG: 0.2 INJECTION, SOLUTION INTRAMUSCULAR; INTRAVENOUS at 17:26

## 2024-05-18 RX ADMIN — Medication 100 ML/HR: at 20:18

## 2024-05-18 RX ADMIN — KETOROLAC TROMETHAMINE 30 MG: 30 INJECTION, SOLUTION INTRAMUSCULAR at 17:45

## 2024-05-18 RX ADMIN — ACETAMINOPHEN 650 MG: 325 TABLET, FILM COATED ORAL at 19:29

## 2024-05-18 RX ADMIN — CEFAZOLIN 2 G: 10 INJECTION, POWDER, FOR SOLUTION INTRAVENOUS at 18:00

## 2024-05-18 RX ADMIN — Medication 2 MILLI-UNITS/MIN: at 10:22

## 2024-05-18 RX ADMIN — TRANEXAMIC ACID 1 G: 10 INJECTION, SOLUTION INTRAVENOUS at 17:24

## 2024-05-18 RX ADMIN — SODIUM CHLORIDE, POTASSIUM CHLORIDE, SODIUM LACTATE AND CALCIUM CHLORIDE 600 ML: 600; 310; 30; 20 INJECTION, SOLUTION INTRAVENOUS at 16:05

## 2024-05-18 RX ADMIN — MISOPROSTOL 800 MCG: 200 TABLET ORAL at 17:22

## 2024-05-18 RX ADMIN — Medication: at 16:09

## 2024-05-18 RX ADMIN — SODIUM CHLORIDE, POTASSIUM CHLORIDE, SODIUM LACTATE AND CALCIUM CHLORIDE: 600; 310; 30; 20 INJECTION, SOLUTION INTRAVENOUS at 10:16

## 2024-05-18 RX ADMIN — Medication 340 ML/HR: at 17:12

## 2024-05-18 RX ADMIN — FENTANYL CITRATE 100 MCG: 50 INJECTION, SOLUTION INTRAMUSCULAR; INTRAVENOUS at 15:26

## 2024-05-18 ASSESSMENT — ACTIVITIES OF DAILY LIVING (ADL)
ADLS_ACUITY_SCORE: 20
ADLS_ACUITY_SCORE: 35
ADLS_ACUITY_SCORE: 20

## 2024-05-18 NOTE — PROVIDER NOTIFICATION
"   05/18/24 1614   Provider Notification   Provider Name/Title Dr. Jonathan Peralta   Method of Notification Electronic Page   Request Evaluate in Person   Notification Reason SVE;Pain     Text page sent to MD. \"SVE 8/90/-1. Not yet comfortable following epidural, breathing through contractions and reporting strong pressure. Requesting you come to the hospital now.\"    MD returned call to the unit, she is 20 minutes from the hospital.  "

## 2024-05-18 NOTE — H&P
H&P Update 2024     No significant change in general health status based on examination of the patient, review of Nursing Admission Database and prenatal record.    34 year old  at 39w6d presented for elective induction.  EFW AGA, 8.5#, pelvis proven to 8.5#.  pushed 4 hours with OP baby last time.    Cvx 3.5/60/-2 post soft.  Plan pitocin now, AROM when in a good contraction pattern.  Pain meds as requested.  Birth plan: delayed cord clamping.    Admit for elective IOL    Vidya Peralta MD, MPH  [facility] OB/Gyn

## 2024-05-18 NOTE — PROVIDER NOTIFICATION
05/18/24 0936   Provider Notification   Provider Name/Title Dr. Jonathan Peralta   Method of Notification At Bedside   Request Evaluate in Person   Notification Reason Patient Arrived     MD at bedside to discuss plan of care for elective induction. SVE 3.5/60/-2. Verbal orders with readback for Pitocin 2x2, intrapartum orders received. Patient is undecided about pain management at this time, she did have an epidural with her first baby and is open to her options. MD will reassess around 1200 today and may AROM at that time. All patient questions answered.

## 2024-05-18 NOTE — PROVIDER NOTIFICATION
"   05/18/24 1520   Provider Notification   Provider Name/Title Dr. Jonathan Peralta   Method of Notification Electronic Page   Request Evaluate - Remote   Notification Reason SVE     Text page sent to MD. \"SVE 6/90/-1. Membranes still intact. Giving patient Fentanyl for pain management.\"    Will update provider if SROM, she is 20 minutes away from the hospital.   "

## 2024-05-18 NOTE — PROVIDER NOTIFICATION
05/18/24 1623   Provider Notification   Provider Name/Title Dr.S Leggett (in House)   Method of Notification Phone     Primary RN requested to call in house to come to bedside for delivery awaiting Dr. Peralta.   MD coming to bedside

## 2024-05-18 NOTE — L&D DELIVERY NOTE
"Delivery Summary    34 year old  at 39w6d admitted for elective induction.  Labored after pitocin up to 8 mU/min.  Epidural for pain control.  Delivered vigorous male infant with apgars pending weight pending.  Initially with pushing baby was OP then rotated to OA at delivery, with nuchal hand.   Delayed cord clamping 2 min.  Placenta spontaneous, intact with 3VC.  Very superficial perineal laceration, and left periurethral laceration, hemostatic and not repaired.  Boggy lower uterine segment.  Manual sweep x3.  IV TXA, IM methergine, and pr cytotec given.  IV ancef 2g dosed due to manual sweep.  An additional bag of pitocin is recommended.  After uterine massage and additional medications, uterine tone was excellent and lochia was normal occasional trickle.  QBL 597cc.  \"Dirk\"    Vidya Peralta MD, MPH  [facility] OB/[facility]yn         Ricco MckeonEmmaRoya [[id number]]      Labor Event Times      Active labor onset date: 24 Onset time:  3:17 PM   Dilation complete date: 24 Complete time:  4:06 PM   Start pushing date/time: 2024 1641          Labor Events     labor?: No   steroids: None  Labor Type: Induction/Cervical ripening  Predominate monitoring during 1st stage: continuous electronic fetal monitoring       Rupture date/time: 24 1641   Rupture type: Artificial Rupture of Membranes  Fluid color: Clear  Fluid odor: Normal       Induction date/time: 24 1022    Cervical ripening date/time:      Indications for induction: Elective     Augmentation: Oxytocin  Indications for augmentation: Ineffective Contraction Pattern       Delivery/Placenta Date and Time      Delivery Date: 24 Delivery Time:  5:09 PM   Placenta Date/Time: 2024  5:14 PM  Oxytocin given at the time of delivery: after delivery of baby  Delivering clinician: Vidya Peralta MD   Other personnel present at delivery:  Provider Role   Amparo Wasserman RN Delivery Nurse "   Sylvester Durant, RN Delivery Assist             Vaginal Counts       Initial count performed by 2 team members:  Two Team Members   ZAHEER Mehta Dr.         [place] Suture Needles Sponges (RETIRED) Instruments   Initial counts 2  5    Added to count       Relief counts       Final counts               Placed during labor Accounted for at the end of labor   FSE No NA   IUPC No NA   Cervidil No NA                             Cord      Cord Complications: None               Cord Blood Disposition: Discard    Gases Sent?: No    Delayed cord clamping?: Yes    Cord Clamping Delay (seconds): >120 seconds    Stem cell collection?: No           [place] Resuscitation    Methods: None       Labor Events and Shoulder Dystocia    Fetal Tracing Prior to Delivery: Category 2  Shoulder dystocia present?: Neg       Delivery (Maternal) (Provider to Complete) (718709)    Episiotomy: None  Perineal lacerations: 1st Repaired?: No     Periurethral laceration: right Repaired?: No   Repair suture: None  Genital tract inspection done: Pos       Blood Loss  Mother: Roya Mckeon #[id number]     Start of Mother's Information      Delivery Blood Loss  24 1517 - 24 1753      Delivery QBL (mL) Hospital Encounter 597 mL    Total  597 mL               End of Mother's Information  Mother: Roya Mckeon #[id number]                Delivery - Provider to Complete ([id number])    Delivering clinician: Vidya Peralta MD  Delivery Type (Choose the 1 that will go to the Birth History): Vaginal, Spontaneous                         Other personnel:  Provider Role   Amparo Wasserman RN Delivery Nurse   Sylvester Durant RN Delivery Assist                    Placenta    Date/Time: 2024  5:14 PM  Removal: Spontaneous  Disposition: Hospital disposal             Anesthesia    Method: Epidural, INTRAVENOUS                     Presentation and Position    Presentation: Vertex     Occiput  Anterior                     Vidya Peralta MD

## 2024-05-18 NOTE — PROVIDER NOTIFICATION
05/18/24 1639   Provider Notification   Provider Name/Title Dr. Jonathan Peralta   Method of Notification At Bedside   Request Attend Delivery     MD at bedside for delivery.

## 2024-05-18 NOTE — ANESTHESIA PREPROCEDURE EVALUATION
"Anesthesia Pre-Procedure Evaluation    Patient: Roya Mckeon   MRN: [id number] : 1990        Procedure :           Past Medical History:   Diagnosis Date    Chorioamnionitis in third trimester 2017     (spontaneous vaginal delivery) 2017    Varicella       History reviewed. No pertinent surgical history.   No Known Allergies   Social History     Tobacco Use    Smoking status: Never    Smokeless tobacco: Never   Substance Use Topics    Alcohol use: Never      Wt Readings from Last 1 Encounters:   24 110.5 kg (243 lb 11.2 oz)        Anesthesia Evaluation   Pt has not had prior anesthetic         ROS/MED HX  ENT/Pulmonary:  - neg pulmonary ROS     Neurologic:  - neg neurologic ROS     Cardiovascular:  - neg cardiovascular ROS     METS/Exercise Tolerance:     Hematologic:  - neg hematologic  ROS     Musculoskeletal:       GI/Hepatic:  - neg GI/hepatic ROS     Renal/Genitourinary:       Endo:  - neg endo ROS     Psychiatric/Substance Use:  - neg psychiatric ROS     Infectious Disease:       Malignancy:       Other:            Physical Exam    Airway        Mallampati: II   TM distance: > 3 FB   Neck ROM: full   Mouth opening: > 3 cm    Respiratory Devices and Support         Dental  no notable dental history         Cardiovascular   cardiovascular exam normal          Pulmonary   pulmonary exam normal                OUTSIDE LABS:  CBC:   Lab Results   Component Value Date    WBC 5.8 2024    WBC 8.2 2024    HGB 12.5 2024    HGB 11.2 (L) 2024    HCT 38.0 2024    HCT 34.5 (L) 2024     2024     2024     BMP: No results found for: \"NA\", \"POTASSIUM\", \"CHLORIDE\", \"CO2\", \"BUN\", \"CR\", \"GLC\"  COAGS: No results found for: \"PTT\", \"INR\", \"FIBR\"  POC: No results found for: \"BGM\", \"HCG\", \"HCGS\"  HEPATIC: No results found for: \"ALBUMIN\", \"PROTTOTAL\", \"ALT\", \"AST\", \"GGT\", \"ALKPHOS\", \"BILITOTAL\", \"BILIDIRECT\", \"LROETTA\"  OTHER:   Lab Results "   Component Value Date    A1C 5.1 12/07/2023    TSH 0.50 12/07/2023       Anesthesia Plan    ASA Status:  2       Anesthesia Type: Epidural.              Consents    Anesthesia Plan(s) and associated risks, benefits, and realistic alternatives discussed. Questions answered and patient/representative(s) expressed understanding.     - Discussed:     - Discussed with:  Patient            Postoperative Care            Comments:           neg OB ROS.      Patrick Prather MD    I have reviewed the pertinent notes and labs in the chart from the past 30 days and (re)examined the patient.  Any updates or changes from those notes are reflected in this note.

## 2024-05-18 NOTE — PLAN OF CARE
Data: [facility] presented to [facility]: 2024  7:58 AM.  Patient admitted for induction for elective. Patient is a .  Prenatal record reviewed. Pregnancy has been uncomplicated..  Gestational Age 39w6d. VSS. Fetal movement active. Patient denies uterine contractions, leaking of vaginal fluid/rupture of membranes, vaginal bleeding, abdominal pain, pelvic pressure, nausea, vomiting, headache, visual disturbances, epigastric or URQ pain, significant edema. Support person is present.   Action: Verbal consent for EFM.  Admission assessment completed. Bill of rights reviewed.  Response: Patient verbalized agreement with plan. Will contact Dr Vidya Peralta with update and further orders.

## 2024-05-18 NOTE — ANESTHESIA PROCEDURE NOTES
Epidural catheter Procedure Note    Pre-Procedure   Staff -        Performed By: anesthesiologist  Procedure Documentation  Procedure: epidural catheter   Comments:  Pre-Procedure  Performed by Patrick Prather MD  Location: OB.      PreAnesthestic Checklist: patient identified, IV checked, risks and benefits discussed, informed consent obtained, monitors and equipment checked, pre-op evaluation and at physician/surgeon's request.    Timeout   Correct Patient: Yes  Correct Procedure: Epidural catheter placement  Correct Site: Yes   Correct Position: Yes    Procedure Documentation  Procedure:   Epidural catheter block for Labor    Patient currently in labor and she and OBMD request a labor epidural to control her labor pains. Patient was interviewed and examined. Procedure and risks including but not limited to bleeding, infection, nerve injury, paralysis, PDPH, and inadequate block requiring intervention discussed with patient. Questions answered. This epidural is to be placed in anticipation of vaginal delivery.  She consents to the epidural procedure.  Time-out was performed.  I or my partners remain immediately available for management of any issues or complications and will monitor at appropriate intervals.  Procedure: Patient sitting. Betadine prep x 3. Sterile drape applied.  Lidocaine 1%  local infiltration at L 3-4.  17 G. Tuohy needle at L3-4 by loss of resistance into epidural space.  No CSF, paresthesia or blood. 1.5 % Lidocaine with 1:200,000 Epinephrine 5cc test dose. Then 0.25% bupivicaine 10 cc with NS 5 cc.  Epidural catheter inserted w/o resistance to 5 cm in epidural space.  Aspiration negative for blood and CSF.   Negative for neuro change, paresthesia or symptoms of intravascular injection or intrathecal injection.  Infusion orders written and infusion of 0.125% bupivicaine 15cc per hour started.    Patrick Prather MD               FOR [facility] ([facility]) ONLY:   Pain Team Contact information:  "please page the Pain Team Via [facility]. Search \"Pain\". During daytime hours, please page the attending first. At night please page the resident first.      "

## 2024-05-18 NOTE — PROVIDER NOTIFICATION
05/18/24 1226   Provider Notification   Provider Name/Title Dr. Jonathan Peralta   Method of Notification Electronic Page   Request Evaluate - Remote   Notification Reason Status Update     MD inquiring if patient is ready for AROM at this time. Patient is jg every 2-3.5 minutes but is sleeping through her contractions. Pitocin infusing at 8 annalisa-units/min. Patient states she would like to become more uncomfortable before AROM.

## 2024-05-18 NOTE — PROVIDER NOTIFICATION
05/18/24 1551   Provider Notification   Provider Name/Title Dr. Prather   Method of Notification At Bedside   Request Evaluate in Person   Notification Reason Pain     MDA at bedside for epidural placement.

## 2024-05-18 NOTE — PROVIDER NOTIFICATION
05/18/24 1448   Provider Notification   Provider Name/Title Dr. Jonathan Peralta   Method of Notification Electronic Page   Request Evaluate - Remote   Notification Reason Status Update     Patient is doing well, much more uncomfortable and breathing through her contractions. Up on the ball and coping well unmedicated. Misty regularly. Pitocin remains at 8. Not requesting AROM. Discussed that I could check her anytime, patient would like to wait.    MD states she is 20  minutes away from the hospital.

## 2024-05-18 NOTE — PROVIDER NOTIFICATION
05/18/24 1546   Provider Notification   Provider Name/Title Dr. Jonathan Peralta   Method of Notification Electronic Page   Request Evaluate - Remote   Notification Reason Pain     MD updated by text page. Patient is requesting an epidural, will update MD with exam after she is comfortable.

## 2024-05-19 VITALS
SYSTOLIC BLOOD PRESSURE: 121 MMHG | WEIGHT: 235.9 LBS | RESPIRATION RATE: 14 BRPM | HEART RATE: 79 BPM | BODY MASS INDEX: 36.95 KG/M2 | TEMPERATURE: 98.4 F | DIASTOLIC BLOOD PRESSURE: 70 MMHG | OXYGEN SATURATION: 88 %

## 2024-05-19 LAB — HGB BLD-MCNC: 10.6 G/DL (ref 11.7–15.7)

## 2024-05-19 PROCEDURE — 250N000013 HC RX MED GY IP 250 OP 250 PS 637: Performed by: OBSTETRICS & GYNECOLOGY

## 2024-05-19 PROCEDURE — 85018 HEMOGLOBIN: CPT | Performed by: OBSTETRICS & GYNECOLOGY

## 2024-05-19 PROCEDURE — 999N000080 HC STATISTIC IP LACTATION SERVICES 16-30 MIN

## 2024-05-19 PROCEDURE — 36415 COLL VENOUS BLD VENIPUNCTURE: CPT | Performed by: OBSTETRICS & GYNECOLOGY

## 2024-05-19 RX ORDER — NALOXONE HYDROCHLORIDE 0.4 MG/ML
0.4 INJECTION, SOLUTION INTRAMUSCULAR; INTRAVENOUS; SUBCUTANEOUS
Status: DISCONTINUED | OUTPATIENT
Start: 2024-05-19 | End: 2024-05-19 | Stop reason: HOSPADM

## 2024-05-19 RX ORDER — NALOXONE HYDROCHLORIDE 0.4 MG/ML
0.2 INJECTION, SOLUTION INTRAMUSCULAR; INTRAVENOUS; SUBCUTANEOUS
Status: DISCONTINUED | OUTPATIENT
Start: 2024-05-19 | End: 2024-05-19 | Stop reason: HOSPADM

## 2024-05-19 RX ORDER — IBUPROFEN 600 MG/1
600 TABLET, FILM COATED ORAL EVERY 6 HOURS PRN
Qty: 60 TABLET | Refills: 0 | Status: SHIPPED | OUTPATIENT
Start: 2024-05-19 | End: 2024-07-01

## 2024-05-19 RX ADMIN — DOCUSATE SODIUM 100 MG: 100 CAPSULE, LIQUID FILLED ORAL at 07:28

## 2024-05-19 RX ADMIN — IBUPROFEN 800 MG: 800 TABLET, FILM COATED ORAL at 07:28

## 2024-05-19 RX ADMIN — ACETAMINOPHEN 650 MG: 325 TABLET, FILM COATED ORAL at 11:37

## 2024-05-19 RX ADMIN — IBUPROFEN 800 MG: 800 TABLET, FILM COATED ORAL at 19:32

## 2024-05-19 RX ADMIN — ACETAMINOPHEN 650 MG: 325 TABLET, FILM COATED ORAL at 19:32

## 2024-05-19 RX ADMIN — IBUPROFEN 800 MG: 800 TABLET, FILM COATED ORAL at 13:47

## 2024-05-19 RX ADMIN — IBUPROFEN 800 MG: 800 TABLET, FILM COATED ORAL at 00:21

## 2024-05-19 ASSESSMENT — ACTIVITIES OF DAILY LIVING (ADL)
ADLS_ACUITY_SCORE: 24
ADLS_ACUITY_SCORE: 21
ADLS_ACUITY_SCORE: 21
ADLS_ACUITY_SCORE: 24
ADLS_ACUITY_SCORE: 21
ADLS_ACUITY_SCORE: 24
ADLS_ACUITY_SCORE: 24
ADLS_ACUITY_SCORE: 21
ADLS_ACUITY_SCORE: 21
ADLS_ACUITY_SCORE: 24
ADLS_ACUITY_SCORE: 21
ADLS_ACUITY_SCORE: 24
ADLS_ACUITY_SCORE: 21
ADLS_ACUITY_SCORE: 21
ADLS_ACUITY_SCORE: 24
ADLS_ACUITY_SCORE: 21
DEPENDENT_IADLS:: INDEPENDENT

## 2024-05-19 NOTE — DISCHARGE SUMMARY
[facility] Obstetrics Post-Partum Progress Note          Assessment and Plan:    Assessment:   Post-partum day #1  Normal spontaneous vaginal delivery  L&D complications: None      Doing well.  No excessive bleeding  Pain well-controlled.  Requesting discharge       Plan:   Discharge later today  Follow up 6 weeks           Interval History:   Doing well.  Pain is well-controlled.  No fevers.  No history of foul-smelling vaginal discharge.  Good appetite.  Denies chest pain, shortness of breath, nausea or vomiting.  Vaginal bleeding is similar to a heavy menstrual flow.  Ambulatory.  Breastfeeding well.          Significant Problems:      Past Medical History:   Diagnosis Date    Chorioamnionitis in third trimester 2017     (spontaneous vaginal delivery) 2017    Varicella                    Physical Exam:   All vitals stable  Patient Vitals for the past 12 hrs:   BP Temp Temp src Pulse Resp Weight   24 1556 -- -- -- -- -- 107 kg (235 lb 14.4 oz)   24 1549 121/70 98.4  F (36.9  C) Oral 79 14 --   24 1256 130/87 98.6  F (37  C) Oral 87 16 --   24 0727 132/83 98.5  F (36.9  C) Oral 83 16 --   24 0405 133/76 -- -- 79 -- --     Uterine fundus is firm, non-tender and at the level of the umbilicus  Ext NT     Horacio Bravo MD  2024 3:57 PM

## 2024-05-19 NOTE — PLAN OF CARE
Roya Mckeon transferred to 425 via wheelchair at 2045. Baby transferred via parent's arms. Pt fundus/bleeding WDL - additional vigorous fundal massages performed in recovery, firm throughout, few small clots expressed, bleeding scant-light. Fundus now U/midline. 2nd bag of pitocin infusing. Pt still assist x1, has not yet ambulated. Receiving unit notified of transfer: Yes. Patient and family notified of room change. Report given to Coral at 2045. Belongings sent to receiving unit. Accompanied by Registered Nurse. Oriented patient to surroundings. Call light within reach. ID bands double-checked with receiving RN. Patient tolerated transfer and is stable.    Problem: Adult Inpatient Plan of Care  Goal: Plan of Care Review  Description: The Plan of Care Review/Shift note should be completed every shift.  The Outcome Evaluation is a brief statement about your assessment that the patient is improving, declining, or no change.  This information will be displayed automatically on your shift  note.  Outcome: Progressing  Flowsheets (Taken 5/18/2024 2100)  Plan of Care Reviewed With:   patient   spouse     Problem: Postpartum (Vaginal Delivery)  Goal: Hemostasis  Outcome: Progressing      Plan of Care Reviewed With: patient, spouse

## 2024-05-19 NOTE — ANESTHESIA POSTPROCEDURE EVALUATION
Patient: Roya Mckeon    Procedure: * No procedures listed *       Anesthesia Type:  Epidural    Note:  Disposition: Inpatient   Postop Pain Control: Uneventful            Sign Out: Well controlled pain   PONV: No   Neuro/Psych: Uneventful            Sign Out: Acceptable/Baseline neuro status   Airway/Respiratory: Uneventful            Sign Out: Acceptable/Baseline resp. status   CV/Hemodynamics: Uneventful            Sign Out: Acceptable CV status   Other NRE: NONE   DID A NON-ROUTINE EVENT OCCUR? No    Event details/Postop Comments:  I or my partner was immediately available for management of this patient during epidural analgesia infusion.   Patient post labor epidural catheter, doing well.  She reports good pain relief with epidural catheter.  She denies ongoing sensorimotor block, headache, fever, chills or other complaints.  All questions answered, understanding voiced.  She will have us contacted for any questions or problems.           Last vitals:  Vitals:    05/18/24 2328 05/19/24 0405 05/19/24 0727   BP: 139/78 133/76 132/83   Pulse: 92 79 83   Resp: 16  16   Temp: 98.3  F (36.8  C)  98.5  F (36.9  C)   SpO2:          Electronically Signed By: Elijah Mohamud MD  May 19, 2024  8:34 AM

## 2024-05-19 NOTE — DISCHARGE INSTRUCTIONS
Warning Signs after Having a Baby    Keep this paper on your fridge or somewhere else where you can see it.    Call your provider if you have any of these symptoms up to 12 weeks after having your baby.    Thoughts of hurting yourself or your baby  Pain in your chest or trouble breathing  Severe headache not helped by pain medicine  Eyesight concerns (blurry vision, seeing spots or flashes of light, other changes to eyesight)  Fainting, shaking or other signs of a seizure    Call 9-1-1 if you feel that it is an emergency.     The symptoms below can happen to anyone after giving birth. They can be very serious. Call your provider if you have any of these warning signs.    My provider s phone number: _______________________    Losing too much blood (hemorrhage)    Call your provider if you soak through a pad in less than an hour or pass blood clots bigger than a golf ball. These may be signs that you are bleeding too much.    Blood clots in the legs or lungs    After you give birth, your body naturally clots its blood to help prevent blood loss. Sometimes this increased clotting can happen in other areas of the body, like the legs or lungs. This can block your blood flow and be very dangerous.     Call your provider if you:  Have a red, swollen spot on the back of your leg that is warm or painful when you touch it.   Are coughing up blood.     Infection    Call your provider if you have any of these symptoms:  Fever of 100.4 F (38 C) or higher.  Pain or redness around your stitches if you had an incision.   Any yellow, white, or green fluid coming from places where you had stitches or surgery.    Mood Problems (postpartum depression)    Many people feel sad or have mood changes after having a baby. But for some people, these mood swings are worse.     Call your provider right away if you feel so anxious or nervous that you can't care for yourself or your baby.    Preeclampsia (high blood pressure)    Even if you  didn't have high blood pressure when you were pregnant, you are at risk for the high blood pressure disease called preeclampsia. This risk can last up to 12 weeks after giving birth.     Call your provider if you have:   Pain on your right side under your rib cage  Sudden swelling in the hands and face    Remember: You know your body. If something doesn't feel right, get medical help.     For informational purposes only. Not to replace the advice of your health care provider. Copyright 2020 [facility]. All rights reserved. Clinically reviewed by Iva Celaya, RNC-OB, MSN. [place] [id number] - Rev 02/23.

## 2024-05-19 NOTE — PLAN OF CARE
Goal Outcome Evaluation:       Stable vaginal delivery           Vitals stable. Fundal checks WDL. Patient voided 150 mL. Fundus right of umbilicus, firm without massage, light lochia. Utilizing aleah steady for mobility aid.     Problem: Adult Inpatient Plan of Care  Goal: Plan of Care Review  5/18/2024 1958 by Amparo Wasserman RN  Outcome: Progressing  5/18/2024 1956 by Amparo Wasserman RN  Outcome: Progressing  5/18/2024 1956 by Amparo Wasserman RN  Outcome: Progressing  Goal: Patient-Specific Goal (Individualized)  5/18/2024 1958 by Amparo Wasserman RN  Outcome: Progressing  5/18/2024 1956 by Amparo Wasserman RN  Outcome: Progressing  5/18/2024 1956 by Amparo Wasserman RN  Outcome: Progressing  Goal: Absence of Hospital-Acquired Illness or Injury  5/18/2024 1958 by Amparo Wasserman RN  Outcome: Progressing  5/18/2024 1956 by Amparo Wasserman RN  Outcome: Progressing  5/18/2024 1956 by Amapro Wasserman RN  Outcome: Progressing  Intervention: Prevent Skin Injury  Recent Flowsheet Documentation  Taken 5/18/2024 0830 by Amparo Wasserman RN  Body Position: position changed independently  Goal: Optimal Comfort and Wellbeing  5/18/2024 1958 by Amparo Wasserman RN  Outcome: Progressing  5/18/2024 1956 by Amparo Wasserman RN  Outcome: Progressing  5/18/2024 1956 by Amparo Wasserman RN  Outcome: Progressing  Intervention: Monitor Pain and Promote Comfort  Recent Flowsheet Documentation  Taken 5/18/2024 1745 by Amparo Wasserman RN  Pain Management Interventions: medication (see MAR)  Taken 5/18/2024 1526 by Amparo Wasserman RN  Pain Management Interventions: medication (see MAR)  Taken 5/18/2024 1410 by Amparo Wasserman RN  Pain Management Interventions:   relaxation techniques promoted   repositioned   breathing exercises  Taken 5/18/2024 1319 by Amparo Wasserman RN  Pain Management Interventions: declines  Taken 5/18/2024 1103 by Amparo Wasserman  RN  Pain Management Interventions: declines  Intervention: Provide Person-Centered Care  Recent Flowsheet Documentation  Taken 5/18/2024 0830 by Amparo Wasserman, RN  Trust Relationship/Rapport:   care explained   choices provided   emotional support provided   empathic listening provided   questions answered   questions encouraged   reassurance provided   thoughts/feelings acknowledged  Goal: Readiness for Transition of Care  5/18/2024 1958 by Amparo Wasserman, RN  Outcome: Progressing  5/18/2024 1956 by Amparo Wasserman, RN  Outcome: Progressing  5/18/2024 1956 by Amparo Wasserman, RN  Outcome: Progressing  Intervention: Mutually Develop Transition Plan  Recent Flowsheet Documentation  Taken 5/18/2024 0821 by Amparo Wasserman, RN  Equipment Currently Used at Home: none

## 2024-05-19 NOTE — PLAN OF CARE
"Goal Outcome Evaluation:      Plan of Care Reviewed With: patient    Overall Patient Progress: improvingOverall Patient Progress: improving         VSS, PP checks WNL. Uterus firm,midline after voiding. Lochia moderate to heavy amount. No active trickling/bleeding noted. Use tucks pads. Voiding without difficulty.  Up ad alex. Independent with self and  cares. Ibuprofen for pain. Is breastfeeding and supplementing with formula. Grandparent of baby at bedside, helpful. SL in. Orientation to room and POC given to patient.         Problem: Adult Inpatient Plan of Care  Goal: Plan of Care Review  Description: The Plan of Care Review/Shift note should be completed every shift.  The Outcome Evaluation is a brief statement about your assessment that the patient is improving, declining, or no change.  This information will be displayed automatically on your shift  note.  Outcome: Progressing  Flowsheets (Taken 2024)  Plan of Care Reviewed With: patient  Overall Patient Progress: improving  Goal: Patient-Specific Goal (Individualized)  Description: You can add care plan individualizations to a care plan. Examples of Individualization might be:  \"Parent requests to be called daily at 9am for status\", \"I have a hard time hearing out of my right ear\", or \"Do not touch me to wake me up as it startles  me\".  Outcome: Progressing  Goal: Absence of Hospital-Acquired Illness or Injury  Outcome: Progressing  Intervention: Prevent Skin Injury  Recent Flowsheet Documentation  Taken 2024 by Coral Garibay RN  Body Position: position changed independently  Goal: Optimal Comfort and Wellbeing  Outcome: Progressing  Intervention: Provide Person-Centered Care  Recent Flowsheet Documentation  Taken 2024 by Coral Garibay RN  Trust Relationship/Rapport:   care explained   choices provided  Goal: Readiness for Transition of Care  Outcome: Progressing     Problem: Postpartum (Vaginal " Delivery)  Goal: Successful Parent Role Transition  Outcome: Progressing  Intervention: Support Parent Role Transition  Recent Flowsheet Documentation  Taken 5/18/2024 2328 by Coral Garibay RN  Supportive Measures: active listening utilized  Parent-Child Attachment Promotion: caring behavior modeled  Goal: Hemostasis  Outcome: Progressing  Goal: Absence of Infection Signs and Symptoms  Outcome: Progressing  Intervention: Prevent or Manage Infection  Recent Flowsheet Documentation  Taken 5/18/2024 2328 by Coral Garibay RN  Infection Management: aseptic technique maintained  Goal: Anesthesia/Sedation Recovery  Outcome: Progressing  Goal: Optimal Pain Control and Function  Outcome: Progressing  Intervention: Prevent or Manage Pain  Recent Flowsheet Documentation  Taken 5/18/2024 2328 by Coral Garibay, RN  Perineal Care:   perineal hygiene encouraged   perineal spray bottle/warm water use encouraged  Goal: Effective Urinary Elimination  Outcome: Progressing

## 2024-05-19 NOTE — PROVIDER NOTIFICATION
05/18/24 1624   Provider Notification   Provider Name/Title Dr. EDUAR Leggett   Method of Notification At Bedside   Request Evaluate in Person   Notification Reason Other (Comment)  (Stand by for)     In-house OB Dr. EDUAR Leggett at bedside as patient is reporting strong pressure and the urge to push. Primary OB is on her way but 20 minutes from the hospital. Remained at bedside until Dr. Jonathan Peralta arrived.

## 2024-05-19 NOTE — PROGRESS NOTES
[facility] Obstetrics Post-Partum Progress Note          Assessment and Plan:    Assessment:   Post-partum day #1  Normal spontaneous vaginal delivery  L&D complications: None      Doing well.  No excessive bleeding  Pain well-controlled.  AM Hgb 10.6 (12.5 on admission).  ABLA due to expected losses.  No treatment necessary beyond PNV      Plan:   Ambulation encouraged  Monitor wound for signs of infection  Pain control measures as needed  Reportable signs and symptoms dicussed with the patient  Anticipate discharge tomorrow           Interval History:   Doing well.  Pain is well-controlled.  No fevers.  No history of foul-smelling vaginal discharge.  Good appetite.  Denies chest pain, shortness of breath, nausea or vomiting.  Vaginal bleeding is similar to a heavy menstrual flow.  Ambulatory.  Breastfeeding well.          Significant Problems:      Past Medical History:   Diagnosis Date    Chorioamnionitis in third trimester 2017     (spontaneous vaginal delivery) 2017    Varicella                    Physical Exam:   All vitals stable  Patient Vitals for the past 12 hrs:   BP Temp Temp src Pulse Resp   24 0727 132/83 98.5  F (36.9  C) Oral 83 16   24 0405 133/76 -- -- 79 --   24 2328 139/78 98.3  F (36.8  C) Oral 92 24 137/70 -- -- -- --     Uterine fundus is firm, non-tender and at the level of the umbilicus  Ext NT     Horacio Bravo MD  2024 8:09 AM

## 2024-05-19 NOTE — PLAN OF CARE
"VSS on room air. Fundus/lochia WDL. Voiding appropriately and ambulating independently. Pain adequately controlled with PRN medications. Breastfeeding infant independently q2-3hr. S/o at bedside, supportive. Positive bonding and attachment with infant observed.      Birth Certificate and PP depression screen received. Education completed and AVS/discharge paperwork reviewed.  Patient indicates understanding discharge instructions. Questions answered, concerns addressed, resources provided. Verbalizes when to return to clinic for follow up for herself and infant. Prescriptions reviewed and delivered to patient.     Problem: Adult Inpatient Plan of Care  Goal: Plan of Care Review  Description: The Plan of Care Review/Shift note should be completed every shift.  The Outcome Evaluation is a brief statement about your assessment that the patient is improving, declining, or no change.  This information will be displayed automatically on your shift  note.  Outcome: Met  Flowsheets (Taken 5/19/2024 1805)  Plan of Care Reviewed With: patient  Overall Patient Progress: improving  Goal: Patient-Specific Goal (Individualized)  Description: You can add care plan individualizations to a care plan. Examples of Individualization might be:  \"Parent requests to be called daily at 9am for status\", \"I have a hard time hearing out of my right ear\", or \"Do not touch me to wake me up as it startles  me\".  Outcome: Met  Goal: Absence of Hospital-Acquired Illness or Injury  Outcome: Met  Intervention: Prevent Skin Injury  Recent Flowsheet Documentation  Taken 5/19/2024 1550 by Deandra Pearce, RN  Body Position: position changed independently  Taken 5/19/2024 0732 by Deandra Pearce, RN  Body Position: position changed independently  Goal: Optimal Comfort and Wellbeing  Outcome: Met  Intervention: Monitor Pain and Promote Comfort  Recent Flowsheet Documentation  Taken 5/19/2024 1549 by Deandra Pearce, RN  Pain Management " Interventions: declines  Taken 5/19/2024 1347 by Deandra Pearce, RN  Pain Management Interventions: medication (see MAR)  Taken 5/19/2024 0727 by Deandra Pearce RN  Pain Management Interventions: medication (see MAR)  Intervention: Provide Person-Centered Care  Recent Flowsheet Documentation  Taken 5/19/2024 1550 by Deandra Pearce RN  Trust Relationship/Rapport:   care explained   choices provided   emotional support provided   empathic listening provided   questions answered   questions encouraged   reassurance provided   thoughts/feelings acknowledged  Taken 5/19/2024 0732 by Deandra Pearce RN  Trust Relationship/Rapport:   care explained   choices provided   emotional support provided   empathic listening provided   questions answered   questions encouraged   reassurance provided   thoughts/feelings acknowledged  Goal: Readiness for Transition of Care  Outcome: Met     Problem: Postpartum (Vaginal Delivery)  Goal: Successful Parent Role Transition  Outcome: Met  Goal: Hemostasis  Outcome: Met  Goal: Absence of Infection Signs and Symptoms  Outcome: Met  Goal: Anesthesia/Sedation Recovery  Outcome: Met  Goal: Optimal Pain Control and Function  Outcome: Met  Intervention: Prevent or Manage Pain  Recent Flowsheet Documentation  Taken 5/19/2024 1549 by Deandra Pearce RN  Pain Management Interventions: declines  Taken 5/19/2024 1347 by Deandra Pearce, RN  Pain Management Interventions: medication (see MAR)  Taken 5/19/2024 0727 by Deandra Pearce RN  Pain Management Interventions: medication (see MAR)  Goal: Effective Urinary Elimination  Outcome: Met   Goal Outcome Evaluation:      Plan of Care Reviewed With: patient    Overall Patient Progress: improvingOverall Patient Progress: improving

## 2024-05-20 NOTE — PLAN OF CARE
Patient medicated prior to discharge with Ibuprofen and Tylenol for cramping and advised when next dose is available. All questions answered. Patient discharged with all personal belongings.  Writer escorted patient to vehicle via wheelchair with infant in arms at 1943.

## 2024-07-01 ENCOUNTER — PRENATAL OFFICE VISIT (OUTPATIENT)
Dept: OBGYN | Facility: CLINIC | Age: 34
End: 2024-07-01
Payer: COMMERCIAL

## 2024-07-01 VITALS
DIASTOLIC BLOOD PRESSURE: 80 MMHG | HEIGHT: 67 IN | WEIGHT: 210 LBS | BODY MASS INDEX: 32.96 KG/M2 | SYSTOLIC BLOOD PRESSURE: 110 MMHG

## 2024-07-01 PROCEDURE — G0145 SCR C/V CYTO,THINLAYER,RESCR: HCPCS | Performed by: FAMILY MEDICINE

## 2024-07-01 PROCEDURE — 99207 PR POST PARTUM EXAM: CPT | Performed by: FAMILY MEDICINE

## 2024-07-01 PROCEDURE — 87624 HPV HI-RISK TYP POOLED RSLT: CPT | Performed by: FAMILY MEDICINE

## 2024-07-01 ASSESSMENT — PATIENT HEALTH QUESTIONNAIRE - PHQ9
SUM OF ALL RESPONSES TO PHQ QUESTIONS 1-9: 2
10. IF YOU CHECKED OFF ANY PROBLEMS, HOW DIFFICULT HAVE THESE PROBLEMS MADE IT FOR YOU TO DO YOUR WORK, TAKE CARE OF THINGS AT HOME, OR GET ALONG WITH OTHER PEOPLE: NOT DIFFICULT AT ALL
SUM OF ALL RESPONSES TO PHQ QUESTIONS 1-9: 2

## 2024-07-01 NOTE — PROGRESS NOTES
SUBJECTIVE: Roya is here for a 6-week postpartum checkup.    Delivery date was 2024. She had a  of a viable boy, weight 8 pounds 11.3 oz., with none complications.  Since delivery, she has been breast feeding.  She has No signs of infection, bleeding or other complications.  She is not pregnant.  We discussed contraceptions and she has chosen none and condoms.  She  has not had intercourse since delivery and complains of NA discomfort. Patient screened for postpartum depression and complaints are NEGATIVE. Screening has also been completed for intimate partner violence.    EXAM:  Today's Depression Rating was       2024    10:59 AM   PHQ-9 SCORE   PHQ-9 Total Score MyChart 2 (Minimal depression)   PHQ-9 Total Score 2       GENERAL healthy, alert and no distress  EYES EOMI, intact visual fields, PERRL and funduscopic deferred  HENT: Normocephalic. TM's grossly normal, oropharynx without significant findings.  NECK: no adenopathy, no asymmetry, masses, or scars and thyroid normal to palpation  RESP: Clear to auscultation  BREASTS: NEGATIVE  CV: NEGATIVE  LYMPH: NEGATIVE  GI: aorta normal and bowel sounds normal  : no hernia detected  GYN PELVIC: NEGATIVE, normal external genitalia, normal groin lymphatics, normal urethral meatus, normal vaginal mucosa, normal cervix and normal adnexa, no masses or tenderness  MS: NEGATIVE, Extremities normal. No deformaties, edema or skin discoloration.  SKIN: no ulcers, lesions or rash  NEURO: alert/oriented to person, location and time, CN 2-12 intact, strength 5/5 throughout and symmetric, sensation to light touch grossly intact throughout  PSYCH: NEGATIVE    Discussed risks and benefits of contraception options in detail including oral contraceptive pills, injection, IUD, ring and sterilization. Patient elects condoms.  No contraindications noted.     Patient did not have GDM:         ASSESSMENT:   Normal postpartum exam after .  Hemoglobin 13 at  WIC    PLAN:  Return as needed or at time of next expected pap, pelvic, or breast exam.  Ok to resume intercourse, planning condoms     Dr. Lor Ruiz, DO    Obstetrics and Gynecology  [facility]         Answers submitted by the patient for this visit:  Patient Health Questionnaire (Submitted on 7/1/2024)  If you checked off any problems, how difficult have these problems made it for you to do your work, take care of things at home, or get along with other people?: Not difficult at all  PHQ9 TOTAL SCORE: 2

## 2024-07-01 NOTE — NURSING NOTE
"Chief Complaint   Patient presents with    Postpartum Care     Jermaine Cavazos         Initial /80 (BP Location: Right arm, Patient Position: Chair, Cuff Size: Adult Large)   Ht 1.702 m (5' 7\")   Wt 95.3 kg (210 lb)   LMP 2023 (Exact Date)   Breastfeeding Yes   BMI 32.89 kg/m   Estimated body mass index is 32.89 kg/m  as calculated from the following:    Height as of this encounter: 1.702 m (5' 7\").    Weight as of this encounter: 95.3 kg (210 lb).  BP completed using cuff size: large    Questioned patient about current smoking habits.  Pt. has never smoked.          The following HM Due: NONE  Kate Dodson CMA    "

## 2024-07-01 NOTE — PATIENT INSTRUCTIONS
Return yearly      Dr. Lor Ruiz, DO    Obstetrics and Gynecology  [facility] [place] [facility] [place]

## 2024-07-02 LAB
HPV HR 12 DNA CVX QL NAA+PROBE: NEGATIVE
HPV16 DNA CVX QL NAA+PROBE: NEGATIVE
HPV18 DNA CVX QL NAA+PROBE: NEGATIVE
HUMAN PAPILLOMA VIRUS FINAL DIAGNOSIS: NORMAL

## 2024-07-05 LAB
BKR LAB AP GYN ADEQUACY: NORMAL
BKR LAB AP GYN INTERPRETATION: NORMAL
BKR LAB AP PREVIOUS ABNORMAL: NORMAL
PATH REPORT.COMMENTS IMP SPEC: NORMAL
PATH REPORT.COMMENTS IMP SPEC: NORMAL
PATH REPORT.RELEVANT HX SPEC: NORMAL

## 2024-07-25 ENCOUNTER — MEDICAL CORRESPONDENCE (OUTPATIENT)
Dept: HEALTH INFORMATION MANAGEMENT | Facility: CLINIC | Age: 34
End: 2024-07-25
Payer: COMMERCIAL

## 2024-09-26 ENCOUNTER — MEDICAL CORRESPONDENCE (OUTPATIENT)
Dept: HEALTH INFORMATION MANAGEMENT | Facility: CLINIC | Age: 34
End: 2024-09-26
Payer: COMMERCIAL

## 2025-01-19 ENCOUNTER — HEALTH MAINTENANCE LETTER (OUTPATIENT)
Age: 35
End: 2025-01-19

## 2025-04-28 ENCOUNTER — VIRTUAL VISIT (OUTPATIENT)
Dept: OBGYN | Facility: CLINIC | Age: 35
End: 2025-04-28
Attending: OBSTETRICS & GYNECOLOGY
Payer: COMMERCIAL

## 2025-04-28 DIAGNOSIS — Z34.90 SUPERVISION OF NORMAL PREGNANCY: Primary | ICD-10-CM

## 2025-04-28 PROBLEM — G56.03 CARPAL TUNNEL SYNDROME ON BOTH SIDES: Status: ACTIVE | Noted: 2017-04-18

## 2025-04-28 PROCEDURE — 99207 PR NO CHARGE NURSE ONLY: CPT | Mod: 93

## 2025-04-28 NOTE — PROGRESS NOTES
NPN nurse visit done over the phone. Pt will be given NPN folder and book at her upcoming appt.  Discussed optional screening available to assess chromosomal anomalies. Questions answered. Informed pt of the clinic structure (on-call does deliveries for the day, may be male or female doctor). Pt advised to call the clinic if she has any questions or concerns related to her pregnancy. Prenatal labs will be obtained at her upcoming appt. New prenatal visit scheduled on 24 with Dr DARREN Zamora.    Offered multiple earlier appt options- pt preferred female provider (explained we cannot guarantee gender preference) and did not want morning appt.       6w6d    Menstrual cycles: regular  Date of positive pregnancy test:   Medications stopped upon pos HPT: none    Last pap: 24- due 2029        Patient supplied answers from flow sheet for:  Prenatal OB Questionnaire.  Past Medical History  Have you ever recieved care for your mental health? : No  Have you ever been in a major accident or suffered serious trauma?: No  Within the last year, has anyone hit, slapped, kicked or otherwise hurt you?: No  In the last year, has anyone forced you to have sex when you didn't want to?: No    Past Medical History 2   Have you ever received a blood transfusion?: No  Would you accept a blood transfusion if was medically recommended?: Yes  Does anyone in your home smoke?: No   Is your blood type Rh negative?: No  Have you ever ?: (!) (Patient-Rptd) Yes  Have you been hospitalized for a nonsurgical reason excluding normal delivery?: No  Have you ever had an abnormal pap smear?: No    Past Medical History (Continued)  Do you have a history of abnormalities of the uterus?: No  Did your mother take MEKHI or any other hormones when she was pregnant with you?: No  Do you have any other problems we have not asked about which you feel may be important to this pregnancy?: No                   Florence Webber RN  [place]  [facility]

## 2025-05-05 LAB
ABO + RH BLD: NORMAL
BLD GP AB SCN SERPL QL: NEGATIVE
SPECIMEN EXP DATE BLD: NORMAL

## 2025-05-06 ENCOUNTER — ANCILLARY PROCEDURE (OUTPATIENT)
Dept: ULTRASOUND IMAGING | Facility: CLINIC | Age: 35
End: 2025-05-06
Attending: OBSTETRICS & GYNECOLOGY
Payer: COMMERCIAL

## 2025-05-06 ENCOUNTER — LAB (OUTPATIENT)
Dept: LAB | Facility: CLINIC | Age: 35
End: 2025-05-06
Attending: OBSTETRICS & GYNECOLOGY
Payer: COMMERCIAL

## 2025-05-06 DIAGNOSIS — Z34.90 SUPERVISION OF NORMAL PREGNANCY: ICD-10-CM

## 2025-05-06 LAB
ERYTHROCYTE [DISTWIDTH] IN BLOOD BY AUTOMATED COUNT: 14.2 % (ref 10–15)
EST. AVERAGE GLUCOSE BLD GHB EST-MCNC: 100 MG/DL
HBA1C MFR BLD: 5.1 % (ref 0–5.6)
HBV SURFACE AG SERPL QL IA: NONREACTIVE
HCT VFR BLD AUTO: 35.4 % (ref 35–47)
HCV AB SERPL QL IA: NONREACTIVE
HGB BLD-MCNC: 12.1 G/DL (ref 11.7–15.7)
HIV 1+2 AB+HIV1 P24 AG SERPL QL IA: NONREACTIVE
MCH RBC QN AUTO: 28.4 PG (ref 26.5–33)
MCHC RBC AUTO-ENTMCNC: 34.2 G/DL (ref 31.5–36.5)
MCV RBC AUTO: 83 FL (ref 78–100)
PLATELET # BLD AUTO: 227 10E3/UL (ref 150–450)
RBC # BLD AUTO: 4.26 10E6/UL (ref 3.8–5.2)
WBC # BLD AUTO: 6.2 10E3/UL (ref 4–11)

## 2025-05-06 PROCEDURE — 87340 HEPATITIS B SURFACE AG IA: CPT

## 2025-05-06 PROCEDURE — 76817 TRANSVAGINAL US OBSTETRIC: CPT | Performed by: OBSTETRICS & GYNECOLOGY

## 2025-05-06 PROCEDURE — 87086 URINE CULTURE/COLONY COUNT: CPT

## 2025-05-06 PROCEDURE — 86803 HEPATITIS C AB TEST: CPT

## 2025-05-06 PROCEDURE — 36415 COLL VENOUS BLD VENIPUNCTURE: CPT

## 2025-05-06 PROCEDURE — 76801 OB US < 14 WKS SINGLE FETUS: CPT | Performed by: OBSTETRICS & GYNECOLOGY

## 2025-05-06 PROCEDURE — 86850 RBC ANTIBODY SCREEN: CPT

## 2025-05-06 PROCEDURE — 85027 COMPLETE CBC AUTOMATED: CPT

## 2025-05-06 PROCEDURE — 86780 TREPONEMA PALLIDUM: CPT

## 2025-05-06 PROCEDURE — 86762 RUBELLA ANTIBODY: CPT

## 2025-05-06 PROCEDURE — 87389 HIV-1 AG W/HIV-1&-2 AB AG IA: CPT

## 2025-05-06 PROCEDURE — 83036 HEMOGLOBIN GLYCOSYLATED A1C: CPT

## 2025-05-06 PROCEDURE — 86900 BLOOD TYPING SEROLOGIC ABO: CPT

## 2025-05-06 PROCEDURE — 86901 BLOOD TYPING SEROLOGIC RH(D): CPT

## 2025-05-07 LAB
BACTERIA UR CULT: NO GROWTH
RUBV IGG SERPL QL IA: 2.88 INDEX
RUBV IGG SERPL QL IA: POSITIVE
T PALLIDUM AB SER QL: NONREACTIVE

## 2025-05-08 ENCOUNTER — RESULTS FOLLOW-UP (OUTPATIENT)
Dept: OBGYN | Facility: CLINIC | Age: 35
End: 2025-05-08

## 2025-05-08 NOTE — RESULT ENCOUNTER NOTE
Prenatal labs reviewed and normal, no action needed pending first appointment.  Kassidy Zamora MD

## 2025-06-19 ENCOUNTER — TRANSCRIBE ORDERS (OUTPATIENT)
Dept: MATERNAL FETAL MEDICINE | Facility: CLINIC | Age: 35
End: 2025-06-19

## 2025-06-19 ENCOUNTER — PRENATAL OFFICE VISIT (OUTPATIENT)
Dept: OBGYN | Facility: CLINIC | Age: 35
End: 2025-06-19
Attending: OBSTETRICS & GYNECOLOGY

## 2025-06-19 VITALS
HEIGHT: 67 IN | SYSTOLIC BLOOD PRESSURE: 120 MMHG | DIASTOLIC BLOOD PRESSURE: 70 MMHG | BODY MASS INDEX: 35.61 KG/M2 | WEIGHT: 226.9 LBS

## 2025-06-19 DIAGNOSIS — O09.90 SUPERVISION OF HIGH RISK PREGNANCY, ANTEPARTUM: Primary | ICD-10-CM

## 2025-06-19 DIAGNOSIS — E66.01 MORBID OBESITY (H): ICD-10-CM

## 2025-06-19 DIAGNOSIS — O26.90 PREGNANCY RELATED CONDITION, ANTEPARTUM: Primary | ICD-10-CM

## 2025-06-19 DIAGNOSIS — Z11.8 SCREENING FOR CHLAMYDIAL DISEASE: ICD-10-CM

## 2025-06-19 DIAGNOSIS — O09.529 ANTEPARTUM MULTIGRAVIDA OF ADVANCED MATERNAL AGE: ICD-10-CM

## 2025-06-19 ASSESSMENT — EDINBURGH POSTNATAL DEPRESSION SCALE (EPDS)
I HAVE LOOKED FORWARD WITH ENJOYMENT TO THINGS: AS MUCH AS I EVER DID
I HAVE BEEN ANXIOUS OR WORRIED FOR NO GOOD REASON: NO, NOT AT ALL
THINGS HAVE BEEN GETTING ON TOP OF ME: NO, I HAVE BEEN COPING AS WELL AS EVER
I HAVE BEEN SO UNHAPPY THAT I HAVE HAD DIFFICULTY SLEEPING: NOT AT ALL
I HAVE FELT SAD OR MISERABLE: NO, NOT AT ALL
I HAVE BEEN SO UNHAPPY THAT I HAVE BEEN CRYING: NO, NEVER
THE THOUGHT OF HARMING MYSELF HAS OCCURRED TO ME: NEVER
I HAVE FELT SCARED OR PANICKY FOR NO GOOD REASON: NO, NOT AT ALL
I HAVE BEEN ABLE TO LAUGH AND SEE THE FUNNY SIDE OF THINGS: AS MUCH AS I ALWAYS COULD
TOTAL SCORE: 0
I HAVE BLAMED MYSELF UNNECESSARILY WHEN THINGS WENT WRONG: NO, NEVER

## 2025-06-19 NOTE — NURSING NOTE
"Chief Complaint   Patient presents with    Prenatal Care     First Ob visit, 13 weeks 0 days. ESAU change from 12/16 to 12/25     initial /70 (BP Location: Right arm, Cuff Size: Adult Large)   Ht 1.702 m (5' 7\")   Wt 102.9 kg (226 lb 14.4 oz)   LMP 03/11/2025   BMI 35.54 kg/m   Estimated body mass index is 35.54 kg/m  as calculated from the following:    Height as of this encounter: 1.702 m (5' 7\").    Weight as of this encounter: 102.9 kg (226 lb 14.4 oz).  BP completed using cuff size large    Initial OB.    Argenis Douglass LPN on 6/19/2025 at 1:03 PM    "

## 2025-06-19 NOTE — PATIENT INSTRUCTIONS
Please start a daily low dose aspirin, 81mg, between now and 16 weeks and continue throughout pregnancy to reduce your risk of preeclampsia.     Early Pregnancy Information:    Prenatal Education Resource:  [email or web address]    Rest  Your body requires more sleep in early pregnancy. Try to get plenty of sleep at night or take a short nap during the day. Being tired can often trigger nausea. If your nausea is worse in the evening, try taking a nap before dinner.    Exercise  Energy levels are normally low in early pregnancy and exercise may be the last thing on your mind, but getting out and walking briskly for 30 minutes each day will increase metabolism, relieve stress and psychologically improve your outlook. Walking after meals can improve heartburn, constipation, and indigestion.   - You can generally continue the same exercise routine in early pregnancy that you were doing prior to pregnancy. If you were not getting daily exercise before, now is a great time to start by walking or biking for 30 minutes daily.  - Any exercise that causes cramping, bleeding, or pain needs to be stopped right away. Please report to your doctor.    [place]  It is safe to continue sexual activity in pregnancy. If you experience bleeding or pain with intercourse, please abstain until you are able to discuss this with your doctor.       Nausea & Vomiting  Women experience this problem in varying degrees during pregnancy and you may have different experiences in subsequent pregnancies. Some women experience  morning sickness,  but it is also common to experience nausea any time throughout the day.  Listen to your body and eat the kinds of foods that make you feel best.  Suggestions for Diet  The most important rule is to eat small amounts often - even if you are not hungry. Try not to go more than three hours without eating during the day or ten hours at night. An empty stomach triggers nausea.   Eat slowly and  avoid foods that are spicy or high in fat. These are difficult to digest. Do not overfill your stomach.   Drink fruit juices, water and milk between meals.   Eat a few crackers, dry toast or vanilla wafers before getting out of bed in the morning. Stay in bed 15-20 minutes after eating.  Give yourself extra time in the morning.   Do not brush your teeth until you have been up for a while.   Do not skip breakfast.   Have a snack at bedtime that includes both carbohydrates and protein, e.g., peanut butter toast or hot chocolate made with milk.  A specific food or drink may trigger nausea in one woman and alleviate it in another. Find out what works best for you and eliminate the foods that cause nausea.   Most women tolerate ice cold drinks and foods best. Sherbet and fruit juices are good examples.   Try avoid coffee and products containing caffeine; it increases stomach acid. About 1 cup of coffee daily is considered safe in pregnancy, but this may be triggering your nausea.  Avoid smoking: it also increases stomach acid.    Vitamins  Vitamins B6 and Vitamin C may help improve nausea. There have been no definite studies to prove this effective, but some women do improve.   To prevent nausea take: 25 mg of Vitamin B6 daily. You can take this up to 3 times daily. You may have to cut a tablet in half to get to 25mg   Take: 500 mg. Vitamin C daily.   Yogurt is a good source of the B Vitamins.   If taking your prenatal vitamin increases or causes nausea, stop for 7-10 days, then try again. You can also try switching to a formulation without iron in early pregnancy (a women's once daily vitamin).   Medication and other remedies  Unisom, taken as directed, may be used with Vitamin B6. Take 25mg of unisom at night, this can make you drowsy.   Yvonne tablets, 250 mg, 2-4 times per day   Acupressure Wrist Bands (Sea Bands)   Peppermint or yvonne decaffeinated tea   Peppermint gum or candy  Inform your doctor if:  You cannot  keep any solid food down for 24 hours.   You cannot keep liquids down.   You are losing weight.   You are running a temperature greater than 100.4  F.    Common Ailments:  Below is a list of medications that are safe to take in pregnancy. This is not everything that is safe, but merely a list of over the counter options to get you through frequently experienced symptoms.     Upper Respiratory Infections:  Sinus congestion and colds - Plain Sudafed (not extended release, avoid until at least 13 weeks gestational age)  Antihistamines -  Claritin, Benadryl, Zyrtec  Avoid nasal sprays, except for Saline only.  Sore Throat:  Lozenges - Cepacol, Chloraseptic  Cough drops - Halls, Vicks, or lemon drops    Headache, Pain, or Fever:  Tylenol or other acetaminophen products: 650-1000 mg every 6-8 hours (up to 3 gm/24 hours) as needed.   A single serving of caffeine   Increase fluid consumption.  Try a short nap. If not relieved, call the office.     Heartburn:  Sodium-free antacids - Gaviscon, Maalox, Mylanta, Tums  Acid Reflux - Prilosec, Pepcid daily  Gas: Simethicone products - Gas-X    Constipation:  Fiber supplements - Fibercon, Metamucil (powder, capsules, or wafers)  Stool softeners - Colace (Docusate Sodium),   Laxatives -Milk of Magnesia, Senna, Miralax    Diarrhea:  Imodium, Imodium AD  Avoid dairy products and stop prenatal vitamins until diarrhea subsides. If not resolved in 24-36 hours, call the office.    Insect Bites and Rashes:  Lotions - Calamine, Caladryl, Hydrocortisone 1%, DEET bug spray  Sprays - DEET bug spray  Clothing treatments - permethrin   If rash is unusual or persists, call the office.    Hemorrhoids:  Preparation H, Anusol, Tucks pads    Call the clinic [phone] right away with any of the following concerns. These phones are answered at all hours:    1.   Severe abdominal pain or cramping, that does not go away with rest and hydration    2.   Vaginal bleeding.      Continue your prenatal  vitamins daily.    Please call with any additional concerns that your have, and continue your prenatal visits every four weeks.    You can set up several future appointments ahead of time:    Schedule appointment every 4-5 weeks from 1st ob visit to 28 weeks   Schedule appointment every 2-3 weeks till 36 weeks  Schedule appointment every week from 36 weeks till delivery

## 2025-06-19 NOTE — PROGRESS NOTES
New OB Visit  Zenobia Dasilva   2025   13w0d     Subjective: Zenobia Dasilva 35 year old  at 13w0d dated by early ultrasound here today for initial OB visit.Estimated Date of Delivery: Dec 25, 2025. Patient reports Nausea and food aversions. Denies cramping and vaginal spotting.     Gyn History:   Menses: LMP: Patient's last menstrual period was 2025. frequency: q month  Sexually transmitted disease history: none.    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    History of GDM: No   Hx PTL : No   History of HTN in pregnancy: No   Shoulder dystocia: No   Vacuum Extraction: No   PPH: No   3rd of 4th degree laceration: No   Other complications: No    Since her last LMP she denies use of alcohol, tobacco and street drugs.    OBhx:  x 2, 2nd baby 8bl 11oz. Had an epidural but reports feeling everything  OB History    Para Term  AB Living   3 2 2 0 0 2   SAB IAB Ectopic Multiple Live Births   0 0 0 0 2      # Outcome Date GA Lbr Kev/2nd Weight Sex Type Anes PTL Lv   3 Current            2 Term 24 39w6d 00:49 / 01:03 3.95 kg (8 lb 11.3 oz) M Vag-Spont EPI, IV N ELI      Name: Dirk Dasilva      Apgar1: 9  Apgar5: 9   1 Term 17 40w4d  3.755 kg (8 lb 4.5 oz) F Vag-Spont  N ELI      Complications: Fetal Intolerance      Name: BG ZENOBIA DASILVA      Apgar1: 8  Apgar5: 9       ROS: Ten point review of systems was reviewed and negative except the above.    HISTORY:  Past Medical History:   Diagnosis Date    Chorioamnionitis in third trimester 2017     (spontaneous vaginal delivery) 2017    Varicella      Past Surgical History:   Procedure Laterality Date    NO HISTORY OF SURGERY       Family History   Problem Relation Age of Onset    No Known Problems Mother     No Known Problems  Father     No Known Problems Sister     No Known Problems Sister     No Known Problems Brother     No Known Problems Maternal Grandmother     No Known Problems Maternal Grandfather     No Known Problems Paternal Grandmother     No Known Problems Paternal Grandfather     Breast Cancer No family hx of      Social History     Socioeconomic History    Marital status:      Spouse name: Jayson Mckeon    Number of children: 2    Years of education: None    Highest education level: None   Occupational History    Occupation: Homemaker   Tobacco Use    Smoking status: Never    Smokeless tobacco: Never   Vaping Use    Vaping status: Never Used   Substance and Sexual Activity    Alcohol use: Never    Drug use: Never    Sexual activity: Yes     Partners: Male     Social Drivers of Health     Financial Resource Strain: Not on File (2024)    Received from [facility] [facility]    Financial Resource Strain     Financial Resource Strain: 0   Food Insecurity: Not on File (2024)    Received from [facility] [facility]    Food Insecurity     Food: 0   Transportation Needs: Not on File (2024)    Received from [facility] [facility]    Transportation Needs     Transportation: 0   Physical Activity: Not on File (2024)    Received from [facility] [facility]    Physical Activity     Physical Activity: 0   Stress: Not on File (2024)    Received from [facility] [facility]    Stress     Stress: 0   Social Connections: Not on File (9/15/2024)    Received from [facility] [facility]    Social Connections     Connectedness: 0   Housing Stability: Not on File (2024)    Received from [employer] [facility]    Housing Stability     Housin     Current Outpatient Medications   Medication Sig Dispense Refill    Prenatal Vit-Fe Fumarate-FA (PNV PRENATAL PLUS MULTIVITAMIN) 27-1 MG TABS per tablet Take 1 tablet by mouth daily.       No current facility-administered medications for this visit.     No Known Allergies    Past medical, surgical, social and family history were reviewed and updated in EPIC.      EXAM:  /70 (BP  "Location: Right arm, Cuff Size: Adult Large)   Ht 1.702 m (5' 7\")   Wt 102.9 kg (226 lb 14.4 oz)   LMP 2025   BMI 35.54 kg/m       Gen:  no acute distress, comfortable  HENT: No scleral injection or icterus  CV: Regular rate and rhythm, no murmur  Resp: CTAB, Normal work of breathing, no cough  GI: Abdomen soft, non-tender. No masses, organomegaly  Skin: No suspicious lesions or rashes  Psychiatric: mentation appears normal and affect bright   +      Recent Labs   Lab Test 25  1111   AS Negative     Rhogam not indicated     Recent Labs   Lab Test 25  1111   HEPBANG Nonreactive   HIAGAB Nonreactive   RUQIGG Positive       Treponemal antibody neg    CBC RESULTS:   Recent Labs   Lab Test 25  1111   WBC 6.2   RBC 4.26   HGB 12.1   HCT 35.4   MCV 83   MCH 28.4   MCHC 34.2   RDW 14.2          ASSESSMENT:  35 year old  at 13w0d dated by early US here for NOB visit.    PLAN:    1)Prenatal labs reviewed. She has no questions.  2) EDUCATION : RECOMMENDED WEIGHT GAIN: 10-20 lbs given Body mass index is 35.54 kg/m ..   - Instructed on best evidence for: healthy diet and foods to avoid; exercise and activity during pregnancy; and maintenance of a generally healthy lifestyle. Reviewed early pregnancy education, provider coverage, labor and delivery, and prenatal visits.  Discussed the harms, benefits, side effects and alternative therapies for current prescribed and OTC medications.  - recommend PNV  3) Discussed options for screening for chromosomal anomalies, including first screen, noninvasive prenatal testing, CVS/amniocentesis, quad screen, and ultrasound at 18-20 weeks. She is electing ultrasound at 18-20 weeks and declines NIPT.  4) AMA/Obesity: recommend low dose Aspirin starting 12-16w.     Follow up in 4-5 weeks. She is encouraged to call sooner with questions or concerns.    Kassidy Zamora MD   Obstetrics and Gynecology   "

## 2025-06-23 ENCOUNTER — RESULTS FOLLOW-UP (OUTPATIENT)
Dept: OBGYN | Facility: CLINIC | Age: 35
End: 2025-06-23

## 2025-07-17 ENCOUNTER — PRENATAL OFFICE VISIT (OUTPATIENT)
Dept: OBGYN | Facility: CLINIC | Age: 35
End: 2025-07-17

## 2025-07-17 VITALS
BODY MASS INDEX: 34.67 KG/M2 | DIASTOLIC BLOOD PRESSURE: 80 MMHG | WEIGHT: 220.9 LBS | SYSTOLIC BLOOD PRESSURE: 120 MMHG | HEIGHT: 67 IN

## 2025-07-17 DIAGNOSIS — O09.529 ANTEPARTUM MULTIGRAVIDA OF ADVANCED MATERNAL AGE: Primary | ICD-10-CM

## 2025-07-17 DIAGNOSIS — R21 SKIN RASH: ICD-10-CM

## 2025-07-17 DIAGNOSIS — R42 DIZZINESS: ICD-10-CM

## 2025-07-17 LAB
HGB BLD-MCNC: 13 G/DL (ref 11.7–15.7)
MCV RBC AUTO: 81 FL (ref 78–100)

## 2025-07-17 PROCEDURE — 36415 COLL VENOUS BLD VENIPUNCTURE: CPT | Performed by: STUDENT IN AN ORGANIZED HEALTH CARE EDUCATION/TRAINING PROGRAM

## 2025-07-17 PROCEDURE — 85018 HEMOGLOBIN: CPT | Performed by: STUDENT IN AN ORGANIZED HEALTH CARE EDUCATION/TRAINING PROGRAM

## 2025-07-17 PROCEDURE — 99207 PR PRENATAL VISIT: CPT | Performed by: STUDENT IN AN ORGANIZED HEALTH CARE EDUCATION/TRAINING PROGRAM

## 2025-07-17 RX ORDER — ASPIRIN 81 MG/1
81 TABLET ORAL DAILY
COMMUNITY

## 2025-07-17 NOTE — PROGRESS NOTES
[facility]  Return OB Visit    Roya Mckeon is a 35 year old  at 17w0d who presents for return OB visit.    Pregnancy notable for:  - Labs: Rh pos/RI.  Declines genetic testing.  [] TDaP  - AMA/Obesity - ASA, L2  - Pelvis proven to [id number] - Consider 3T Day    Today   - Nausea better. Declines meds.  - Has developed rash on arms characterized by hypopigmented blotches. Started with small red spots that were itchy and then progressed to the hypopigmentation. No itching of palms or soles. Has tried Eucerin without benefit. Wants to start to take Claritin. Took the all last pregnancy. for new onset allergies. - Derm referral. Continue Eucerin. Start Claritin.  - Feels dizzy at times. - Hgb  - Taking ASA but not daily due to nausea    RTC 4 weeks    Sameer Duenas MD MPH  [facility]  2025 10:45 AM

## 2025-07-21 ENCOUNTER — PRE VISIT (OUTPATIENT)
Dept: MATERNAL FETAL MEDICINE | Facility: CLINIC | Age: 35
End: 2025-07-21

## 2025-07-28 ENCOUNTER — HOSPITAL ENCOUNTER (OUTPATIENT)
Dept: ULTRASOUND IMAGING | Facility: CLINIC | Age: 35
Discharge: HOME | End: 2025-07-28
Attending: OBSTETRICS & GYNECOLOGY

## 2025-07-28 ENCOUNTER — OFFICE VISIT (OUTPATIENT)
Dept: MATERNAL FETAL MEDICINE | Facility: CLINIC | Age: 35
End: 2025-07-28
Attending: OBSTETRICS & GYNECOLOGY

## 2025-07-28 DIAGNOSIS — O09.522 MULTIGRAVIDA OF ADVANCED MATERNAL AGE IN SECOND TRIMESTER: Primary | ICD-10-CM

## 2025-07-28 DIAGNOSIS — O26.90 PREGNANCY RELATED CONDITION, ANTEPARTUM: ICD-10-CM

## 2025-07-28 PROCEDURE — 76811 OB US DETAILED SNGL FETUS: CPT

## 2025-07-28 PROCEDURE — 76811 OB US DETAILED SNGL FETUS: CPT | Mod: 26 | Performed by: OBSTETRICS & GYNECOLOGY

## 2025-07-28 NOTE — NURSING NOTE
Ryoa presents to [facility] for level 2 ultrasound due to AMA, BMI. Patient reports positive fetal movement, denies contractions, leaking of fluid, or bleeding.   SBAR given to ROSALINDA MD, see their note in Epic.

## 2025-07-28 NOTE — PROGRESS NOTES
"Please see \"Imaging\" tab under \"Chart Review\" for details of today's visit.    Sejal Freeman    "

## 2025-08-14 ENCOUNTER — PRENATAL OFFICE VISIT (OUTPATIENT)
Dept: OBGYN | Facility: CLINIC | Age: 35
End: 2025-08-14
Payer: MEDICAID

## 2025-08-14 VITALS — BODY MASS INDEX: 35.47 KG/M2 | DIASTOLIC BLOOD PRESSURE: 86 MMHG | WEIGHT: 226.5 LBS | SYSTOLIC BLOOD PRESSURE: 134 MMHG

## 2025-08-14 DIAGNOSIS — O09.529 ANTEPARTUM MULTIGRAVIDA OF ADVANCED MATERNAL AGE: ICD-10-CM

## 2025-08-14 DIAGNOSIS — O09.90 SUPERVISION OF HIGH RISK PREGNANCY, ANTEPARTUM: Primary | ICD-10-CM
